# Patient Record
Sex: MALE | Race: WHITE | HISPANIC OR LATINO | Employment: FULL TIME | URBAN - METROPOLITAN AREA
[De-identification: names, ages, dates, MRNs, and addresses within clinical notes are randomized per-mention and may not be internally consistent; named-entity substitution may affect disease eponyms.]

---

## 2017-01-04 ENCOUNTER — TELEPHONE (OUTPATIENT)
Dept: VASCULAR LAB | Facility: MEDICAL CENTER | Age: 62
End: 2017-01-04

## 2017-01-09 ENCOUNTER — ANTICOAGULATION MONITORING (OUTPATIENT)
Dept: VASCULAR LAB | Facility: MEDICAL CENTER | Age: 62
End: 2017-01-09

## 2017-01-09 LAB — INR PPP: 2.7 (ref 2–3.5)

## 2017-01-10 NOTE — PROGRESS NOTES
Anticoagulation Summary as of 1/9/2017     INR goal 2.0-3.0    Selected INR 2.7 (1/9/2017)    Maintenance plan 3 mg (3 mg x 1) on Wed; 4.5 mg (3 mg x 1.5) all other days    Weekly total 30 mg    Plan last modified Maci Pathak PHARMD (5/11/2015)    Next INR check 1/23/2017    Target end date Indefinite      Anticoagulation Episode Summary     INR check location Home Draw    Preferred lab     Send INR reminders to     Comments       Anticoagulation Care Providers     Provider Role Specialty Phone number    El Ellis, KERRI           Anticoagulation Patient Findings    Left voicemail message to report a therapeutic INR of 2.7.  Pt to continue with current warfarin dosing regimen. Requested pt contact the clinic for any s/s of unusual bleeding, bruising, clotting or any changes to diet or medication. FU 2 weeks.  Also informed pt of Pt self monitoring program, please follow up at next call if he is interested.    El Ellis, YANCID

## 2017-02-01 ENCOUNTER — TELEPHONE (OUTPATIENT)
Dept: VASCULAR LAB | Facility: MEDICAL CENTER | Age: 62
End: 2017-02-01

## 2017-02-01 LAB — INR PPP: 2.4 (ref 2–3.5)

## 2017-02-02 ENCOUNTER — ANTICOAGULATION MONITORING (OUTPATIENT)
Dept: VASCULAR LAB | Facility: MEDICAL CENTER | Age: 62
End: 2017-02-02

## 2017-02-02 NOTE — PROGRESS NOTES
Anticoagulation Summary as of 2/2/2017     INR goal 2.0-3.0    Selected INR 2.4 (2/1/2017)    Maintenance plan 3 mg (3 mg x 1) on Wed; 4.5 mg (3 mg x 1.5) all other days    Weekly total 30 mg    Plan last modified Maci Pathak, PHARMD (5/11/2015)    Next INR check 3/1/2017    Target end date Indefinite      Anticoagulation Episode Summary     INR check location Home Draw    Preferred lab     Send INR reminders to     Comments       Anticoagulation Care Providers     Provider Role Specialty Phone number    El Ellis, YANCID           Anticoagulation Patient Findings    Left voicemail message to report a therapeutic INR of 2.4.  Pt to continue with current warfarin dosing regimen. Requested pt contact the clinic for any s/s of unusual bleeding, bruising, clotting or any changes to diet or medication. FU 4 weeks.  Miri Ragsdale, PHARMD

## 2017-02-11 ENCOUNTER — HOSPITAL ENCOUNTER (OUTPATIENT)
Dept: LAB | Facility: MEDICAL CENTER | Age: 62
End: 2017-02-11
Attending: FAMILY MEDICINE
Payer: COMMERCIAL

## 2017-02-11 LAB
ALBUMIN SERPL BCP-MCNC: 3.6 G/DL (ref 3.2–4.9)
ALBUMIN/GLOB SERPL: 1.2 G/DL
ALP SERPL-CCNC: 29 U/L (ref 30–99)
ALT SERPL-CCNC: 12 U/L (ref 2–50)
ANION GAP SERPL CALC-SCNC: 8 MMOL/L (ref 0–11.9)
AST SERPL-CCNC: 13 U/L (ref 12–45)
BILIRUB SERPL-MCNC: 0.5 MG/DL (ref 0.1–1.5)
BUN SERPL-MCNC: 19 MG/DL (ref 8–22)
CALCIUM SERPL-MCNC: 9.5 MG/DL (ref 8.5–10.5)
CHLORIDE SERPL-SCNC: 104 MMOL/L (ref 96–112)
CHOLEST SERPL-MCNC: 126 MG/DL (ref 100–199)
CO2 SERPL-SCNC: 26 MMOL/L (ref 20–33)
CREAT SERPL-MCNC: 1.37 MG/DL (ref 0.5–1.4)
EST. AVERAGE GLUCOSE BLD GHB EST-MCNC: 194 MG/DL
GLOBULIN SER CALC-MCNC: 3 G/DL (ref 1.9–3.5)
GLUCOSE SERPL-MCNC: 179 MG/DL (ref 65–99)
HBA1C MFR BLD: 8.4 % (ref 0–5.6)
HDLC SERPL-MCNC: 31 MG/DL
LDLC SERPL CALC-MCNC: 76 MG/DL
POTASSIUM SERPL-SCNC: 4.4 MMOL/L (ref 3.6–5.5)
PROT SERPL-MCNC: 6.6 G/DL (ref 6–8.2)
SODIUM SERPL-SCNC: 138 MMOL/L (ref 135–145)
TRIGL SERPL-MCNC: 95 MG/DL (ref 0–149)

## 2017-02-11 PROCEDURE — 80061 LIPID PANEL: CPT

## 2017-02-11 PROCEDURE — 80053 COMPREHEN METABOLIC PANEL: CPT

## 2017-02-11 PROCEDURE — 83036 HEMOGLOBIN GLYCOSYLATED A1C: CPT

## 2017-02-11 PROCEDURE — 36415 COLL VENOUS BLD VENIPUNCTURE: CPT

## 2017-02-18 ENCOUNTER — HOSPITAL ENCOUNTER (OUTPATIENT)
Dept: RADIOLOGY | Facility: MEDICAL CENTER | Age: 62
End: 2017-02-18
Attending: FAMILY MEDICINE
Payer: COMMERCIAL

## 2017-02-18 DIAGNOSIS — M54.42 CHRONIC MIDLINE LOW BACK PAIN WITH BILATERAL SCIATICA: ICD-10-CM

## 2017-02-18 DIAGNOSIS — M54.41 CHRONIC MIDLINE LOW BACK PAIN WITH BILATERAL SCIATICA: ICD-10-CM

## 2017-02-18 DIAGNOSIS — G89.29 CHRONIC MIDLINE LOW BACK PAIN WITH BILATERAL SCIATICA: ICD-10-CM

## 2017-02-18 PROCEDURE — 72100 X-RAY EXAM L-S SPINE 2/3 VWS: CPT

## 2017-04-11 ENCOUNTER — ANTICOAGULATION VISIT (OUTPATIENT)
Dept: VASCULAR LAB | Facility: MEDICAL CENTER | Age: 62
End: 2017-04-11
Attending: INTERNAL MEDICINE
Payer: COMMERCIAL

## 2017-04-11 DIAGNOSIS — Z79.01 CHRONIC ANTICOAGULATION: ICD-10-CM

## 2017-04-11 LAB — INR PPP: 2 (ref 2–3.5)

## 2017-04-11 PROCEDURE — 85610 PROTHROMBIN TIME: CPT

## 2017-04-11 PROCEDURE — 99211 OFF/OP EST MAY X REQ PHY/QHP: CPT

## 2017-04-11 NOTE — MR AVS SNAPSHOT
Tobias Morin   2017 7:30 AM   Anticoagulation Visit   MRN: [id number]    Department:  Vascular Medicine   Dept Phone:  [phone]    Description:  Male : 1955   Provider:  [facility] EXAM 5           Allergies as of 2017     Allergen Noted Reactions    Levofloxacin 10/23/2015   Rash, Itching    rash      Vital Signs     Smoking Status                   Former Smoker           Basic Information     Date Of Birth Sex Race Ethnicity Preferred Language    1955 Male White Non- English      Your appointments     2017  7:30 AM   Established Patient with [facility] EXAM 4   [facility]  (--)    [address] [facility] [address]   [phone]              Problem List              ICD-10-CM Priority Class Noted - Resolved    Diabetes mellitus type 2 in obese (CMS-HCC) E11.9, E66.9 Low  2011 - Present    Morbid obesity (CMS-HCC) E66.01 Low  2011 - Present    COREY on CPAP G47.33 Low  2011 - Present    Pulmonary embolism, bilateral (CMS-HCC) I26.99   2011 - Present    Lymphedema I89.0   2011 - Present    Benign prostatic hypertrophy N40.0   2011 - Present    Pulmonary embolism (CMS-HCC) I26.99   2012 - Present    Pulmonary HTN (CMS-HCC) I27.2   9/15/2012 - Present    ARF (acute renal failure) (CMS-HCC) N17.9 Medium  9/15/2012 - Present    COPD (chronic obstructive pulmonary disease) (CMS-HCC) J44.9 Medium  9/15/2012 - Present    PUD (peptic ulcer disease) K27.9   9/15/2012 - Present    Rotator cuff (capsule) sprain S43.429A   2013 - Present    Septic shock (CMS-HCC) A41.9, R65.21 High  10/11/2013 - Present    UTI (urinary tract infection) N39.0 High  10/12/2013 - Present    History of pulmonary embolism Z86.711 Medium  10/12/2013 - Present    Hypomagnesemia E83.42 High  10/12/2013 - Present    Chronic anticoagulation Z79.01 High  10/12/2013 - Present    Hypophosphatemia E83.39 Medium   10/12/2013 - Present    Sepsis (CMS-HCC) A41.9 High  10/23/2015 - Present    Lower urinary tract infectious disease N39.0   10/24/2015 - Present    Hypokalemia E87.6   10/24/2015 - Present    Hyponatremia E87.1   10/24/2015 - Present      Health Maintenance        Date Due Completion Dates    DIABETES MONOFILAMENT / LE EXAM 4/16/1955 ---    RETINAL SCREENING 10/16/1972 ---    IMM DTaP/Tdap/Td Vaccine (1 - Tdap) 10/16/1973 ---    IMM ZOSTER VACCINE 10/16/2014 ---    URINE ACR / MICROALBUMIN 11/15/2015 11/15/2014, 7/5/2014, 10/8/2013, 1/26/2013    A1C SCREENING 8/11/2017 2/11/2017, 11/5/2016, 11/10/2015, 12/22/2011, 11/22/2011    FASTING LIPID PROFILE 2/11/2018 2/11/2017, 11/5/2016, 7/9/2016, 4/23/2016, 1/9/2016, 10/17/2015, 5/9/2015, 11/15/2014, 7/5/2014, 3/29/2014, 12/14/2013, 10/10/2013, 9/28/2013, 6/15/2013, 3/30/2013, 12/1/2012, 6/9/2012, 3/10/2012, 11/22/2011    SERUM CREATININE 2/11/2018 2/11/2017, 11/5/2016, 7/9/2016, 4/23/2016, 1/9/2016, 11/11/2015, 11/10/2015, 11/9/2015, 10/26/2015, 10/24/2015, 10/23/2015, 10/23/2015, 10/17/2015, 6/20/2015, 5/9/2015, 11/15/2014, 11/15/2014, 7/5/2014, 3/29/2014, 12/14/2013, 10/13/2013, 10/12/2013, 10/10/2013, 10/8/2013, 9/28/2013, 8/23/2013, 6/15/2013, 3/30/2013, 1/26/2013, 12/1/2012, 9/22/2012, 9/19/2012, 9/18/2012, 9/17/2012, 9/16/2012, 9/15/2012, 9/14/2012, 9/13/2012, 9/12/2012, 6/9/2012, 3/16/2012, 3/10/2012, 1/11/2012, 1/10/2012, 1/9/2012, 1/8/2012, 1/7/2012, 1/5/2012, 1/5/2012, 1/4/2012, 1/3/2012, 1/3/2012, 12/29/2011, 12/28/2011, 12/27/2011, 12/26/2011, 12/25/2011, 12/24/2011, 12/23/2011, 12/22/2011, 12/21/2011, 11/22/2011    COLONOSCOPY 3/16/2022 3/16/2012            Results     POCT Protime      Component    INR    2.0                        Current Immunizations     Influenza TIV (IM) 10/12/2013 10:29 AM, 9/12/2012  6:11 PM    Influenza Vaccine Quad Inj (Preserved) 11/11/2015  2:00 PM    Pneumococcal polysaccharide vaccine (PPSV-23) 9/12/2012  6:15 PM      Below  and/or attached are the medications your provider expects you to take. Review all of your home medications and newly ordered medications with your provider and/or pharmacist. Follow medication instructions as directed by your provider and/or pharmacist. Please keep your medication list with you and share with your provider. Update the information when medications are discontinued, doses are changed, or new medications (including over-the-counter products) are added; and carry medication information at all times in the event of emergency situations     Allergies:  LEVOFLOXACIN - Rash,Itching               Medications  Valid as of: April 11, 2017 -  7:46 AM    Generic Name Brand Name Tablet Size Instructions for use    Atorvastatin Calcium (Tab) LIPITOR 10 MG Take 10 mg by mouth every evening.        Cefdinir (Cap) OMNICEF 300 MG Take 1 Cap by mouth 2 times a day.        Fenofibrate (Tab) TRIGLIDE 160 MG Take 160 mg by mouth every bedtime.        Lactobacillus (Pack) LACTINEX/FLORANEX  Take 1 Packet by mouth 3 times a day, with meals.        Lisinopril (Tab) PRINIVIL 10 MG Take 10 mg by mouth every bedtime. Indications: High Blood Pressure        Metoprolol Tartrate (Tab) LOPRESSOR 25 MG Take 25 mg by mouth every evening.        Pantoprazole Sodium (Tablet Delayed Response) PROTONIX 40 MG Take 40 mg by mouth 2 times a day.        Sertraline HCl (Tab) ZOLOFT 50 MG Take 50 mg by mouth every bedtime.        Tamsulosin HCl (Cap) FLOMAX 0.4 MG Take 0.4 mg by mouth every bedtime. Indications: Enlarged Prostate with Urination Problems        Warfarin Sodium (Tab) COUMADIN 3 MG Take one to one and one-half (1-1.5) tablets daily as directed by coumadin clinic        .                 Medicines prescribed today were sent to:     [id number] - [place], [place] - [address]    [address] [place] [address]    Phone: [phone] Fax: [phone]    Open 24 Hours?: No      Medication refill instructions:       If your  prescription bottle indicates you have medication refills left, it is not necessary to call your provider’s office. Please contact your pharmacy and they will refill your medication.    If your prescription bottle indicates you do not have any refills left, you may request refills at any time through one of the following ways: The online [employer] system (except Urgent Care), by calling your provider’s office, or by asking your pharmacy to contact your provider’s office with a refill request. Medication refills are processed only during regular business hours and may not be available until the next business day. Your provider may request additional information or to have a follow-up visit with you prior to refilling your medication.   *Please Note: Medication refills are assigned a new Rx number when refilled electronically. Your pharmacy may indicate that no refills were authorized even though a new prescription for the same medication is available at the pharmacy. Please request the medicine by name with the pharmacy before contacting your provider for a refill.        Warfarin Dosing Calendar   April 2017 Details    Sun Mon Tue Wed Thu Fri Sat           1                 2               3               4               5               6               7               8                 9               10               11   2.0   4.5 mg   See details      12      3 mg         13      4.5 mg         14      4.5 mg         15      4.5 mg           16      4.5 mg         17      4.5 mg         18      4.5 mg         19      3 mg         20      4.5 mg         21      4.5 mg         22      4.5 mg           23      4.5 mg         24      4.5 mg         25      4.5 mg         26      3 mg         27      4.5 mg         28      4.5 mg         29      4.5 mg           30      4.5 mg                Date Details   04/11 This INR check   INR: 2.0               How to take your warfarin dose     To take:  3 mg Take 1 of the 3 mg  tablets.    To take:  4.5 mg Take 1.5 of the 3 mg tablets.           Warfarin Dosing Calendar   May 2017 Details    Sun Mon Tue Wed Thu Fri Sat      1      4.5 mg         2      4.5 mg         3      3 mg         4      4.5 mg         5      4.5 mg         6      4.5 mg           7      4.5 mg         8      4.5 mg         9      4.5 mg         10      3 mg         11      4.5 mg         12      4.5 mg         13      4.5 mg           14      4.5 mg         15      4.5 mg         16      4.5 mg         17      3 mg         18      4.5 mg         19      4.5 mg         20      4.5 mg           21      4.5 mg         22      4.5 mg         23      4.5 mg         24      3 mg         25      4.5 mg         26      4.5 mg         27      4.5 mg           28      4.5 mg         29      4.5 mg         30      4.5 mg         31      3 mg             Date Details   No additional details            How to take your warfarin dose     To take:  3 mg Take 1 of the 3 mg tablets.    To take:  4.5 mg Take 1.5 of the 3 mg tablets.           Warfarin Dosing Calendar   June 2017 Details    Sun Mon Tue Wed Thu Fri Sat         1      4.5 mg         2      4.5 mg         3      4.5 mg           4      4.5 mg         5      4.5 mg         6      4.5 mg         7               8               9               10                 11               12               13               14               15               16               17                 18               19               20               21               22               23               24                 25               26               27               28               29               30                 Date Details   No additional details    Date of next INR:  6/6/2017         How to take your warfarin dose     To take:  4.5 mg Take 1.5 of the 3 mg tablets.              MyChart Status: Patient Declined

## 2017-04-11 NOTE — PROGRESS NOTES
Anticoagulation Summary as of 4/11/2017     INR goal 2.0-3.0    Selected INR 2.0 (4/11/2017)    Maintenance plan 3 mg (3 mg x 1) on Wed; 4.5 mg (3 mg x 1.5) all other days    Weekly total 30 mg    Plan last modified Maci Pathak, PHARMD (5/11/2015)    Next INR check 6/6/2017    Target end date Indefinite      Anticoagulation Episode Summary     INR check location Home Draw    Preferred lab     Send INR reminders to     Comments       Anticoagulation Care Providers     Provider Role Specialty Phone number    El Ellis, YANCID           Anticoagulation Patient Findings      Tobias Morin seen in clinic today  INR  therapeutic.    Denies signs/symptoms of bleeding and/or thrombosis.    Denies changes to diet or medications.   Follow up appointment in 8 week(s).    Continue weekly warfarin dose as noted    Barrington Freeman, PHARMD

## 2017-04-12 LAB — INR BLD: 2 (ref 0.9–1.2)

## 2017-05-13 ENCOUNTER — HOSPITAL ENCOUNTER (OUTPATIENT)
Dept: LAB | Facility: MEDICAL CENTER | Age: 62
End: 2017-05-13
Attending: FAMILY MEDICINE
Payer: COMMERCIAL

## 2017-05-13 LAB
ALBUMIN SERPL BCP-MCNC: 3.8 G/DL (ref 3.2–4.9)
ALBUMIN/GLOB SERPL: 1.3 G/DL
ALP SERPL-CCNC: 26 U/L (ref 30–99)
ALT SERPL-CCNC: 13 U/L (ref 2–50)
ANION GAP SERPL CALC-SCNC: 7 MMOL/L (ref 0–11.9)
AST SERPL-CCNC: 16 U/L (ref 12–45)
BILIRUB SERPL-MCNC: 0.6 MG/DL (ref 0.1–1.5)
BUN SERPL-MCNC: 19 MG/DL (ref 8–22)
CALCIUM SERPL-MCNC: 9.4 MG/DL (ref 8.5–10.5)
CHLORIDE SERPL-SCNC: 106 MMOL/L (ref 96–112)
CHOLEST SERPL-MCNC: 111 MG/DL (ref 100–199)
CO2 SERPL-SCNC: 24 MMOL/L (ref 20–33)
CREAT SERPL-MCNC: 1.34 MG/DL (ref 0.5–1.4)
EST. AVERAGE GLUCOSE BLD GHB EST-MCNC: 137 MG/DL
GFR SERPL CREATININE-BSD FRML MDRD: 54 ML/MIN/1.73 M 2
GLOBULIN SER CALC-MCNC: 2.9 G/DL (ref 1.9–3.5)
GLUCOSE SERPL-MCNC: 106 MG/DL (ref 65–99)
HBA1C MFR BLD: 6.4 % (ref 0–5.6)
HDLC SERPL-MCNC: 35 MG/DL
LDLC SERPL CALC-MCNC: 61 MG/DL
POTASSIUM SERPL-SCNC: 4 MMOL/L (ref 3.6–5.5)
PROT SERPL-MCNC: 6.7 G/DL (ref 6–8.2)
SODIUM SERPL-SCNC: 137 MMOL/L (ref 135–145)
TRIGL SERPL-MCNC: 73 MG/DL (ref 0–149)

## 2017-05-13 PROCEDURE — 36415 COLL VENOUS BLD VENIPUNCTURE: CPT

## 2017-05-13 PROCEDURE — 80053 COMPREHEN METABOLIC PANEL: CPT

## 2017-05-13 PROCEDURE — 80061 LIPID PANEL: CPT

## 2017-05-13 PROCEDURE — 83036 HEMOGLOBIN GLYCOSYLATED A1C: CPT

## 2017-05-17 ENCOUNTER — HOSPITAL ENCOUNTER (OUTPATIENT)
Facility: MEDICAL CENTER | Age: 62
End: 2017-05-17
Attending: FAMILY MEDICINE
Payer: COMMERCIAL

## 2017-05-17 PROCEDURE — 81001 URINALYSIS AUTO W/SCOPE: CPT

## 2017-05-17 PROCEDURE — 87086 URINE CULTURE/COLONY COUNT: CPT

## 2017-05-18 LAB
APPEARANCE UR: ABNORMAL
BACTERIA #/AREA URNS HPF: ABNORMAL /HPF
COLOR UR: YELLOW
CULTURE IF INDICATED INDCX: YES UA CULTURE
EPI CELLS #/AREA URNS HPF: ABNORMAL /HPF
GLUCOSE UR STRIP.AUTO-MCNC: NEGATIVE MG/DL
KETONES UR STRIP.AUTO-MCNC: NEGATIVE MG/DL
LEUKOCYTE ESTERASE UR QL STRIP.AUTO: ABNORMAL
MICRO URNS: ABNORMAL
MUCOUS THREADS #/AREA URNS HPF: ABNORMAL /HPF
NITRITE UR QL STRIP.AUTO: NEGATIVE
PH UR STRIP.AUTO: 8 [PH]
PROT UR QL STRIP: 30 MG/DL
RBC # URNS HPF: ABNORMAL /HPF
RBC UR QL AUTO: ABNORMAL
WBC #/AREA URNS HPF: ABNORMAL /HPF

## 2017-05-20 LAB
BACTERIA UR CULT: NORMAL
SIGNIFICANT IND 70042: NORMAL
SOURCE SOURCE: NORMAL

## 2017-06-06 ENCOUNTER — HOSPITAL ENCOUNTER (OUTPATIENT)
Facility: MEDICAL CENTER | Age: 62
End: 2017-06-06
Attending: NURSE PRACTITIONER
Payer: COMMERCIAL

## 2017-06-06 ENCOUNTER — ANTICOAGULATION VISIT (OUTPATIENT)
Dept: VASCULAR LAB | Facility: MEDICAL CENTER | Age: 62
End: 2017-06-06
Attending: INTERNAL MEDICINE
Payer: COMMERCIAL

## 2017-06-06 DIAGNOSIS — Z79.01 CHRONIC ANTICOAGULATION: ICD-10-CM

## 2017-06-06 LAB
INR BLD: 2.1 (ref 0.9–1.2)
INR PPP: 2.1 (ref 2–3.5)

## 2017-06-06 PROCEDURE — 87086 URINE CULTURE/COLONY COUNT: CPT

## 2017-06-06 PROCEDURE — 85610 PROTHROMBIN TIME: CPT

## 2017-06-06 PROCEDURE — 99211 OFF/OP EST MAY X REQ PHY/QHP: CPT

## 2017-06-06 NOTE — MR AVS SNAPSHOT
Tobias Morin   2017 7:30 AM   Anticoagulation Visit   MRN: [id number]    Department:  Vascular Medicine   Dept Phone:  [phone]    Description:  Male : 1955   Provider:  [facility] EXAM 4           Allergies as of 2017     Allergen Noted Reactions    Levofloxacin 10/23/2015   Rash, Itching    rash      Vital Signs     Smoking Status                   Former Smoker           Basic Information     Date Of Birth Sex Race Ethnicity Preferred Language    1955 Male White Non- English      Your appointments     Aug 01, 2017  7:30 AM   Established Patient with [facility] EXAM 4   [facility]  (--)    [address] [facility] [address]   [phone]              Problem List              ICD-10-CM Priority Class Noted - Resolved    Diabetes mellitus type 2 in obese (CMS-HCC) E11.9, E66.9 Low  2011 - Present    Morbid obesity (CMS-HCC) E66.01 Low  2011 - Present    COREY on CPAP G47.33, Z99.89 Low  2011 - Present    Pulmonary embolism, bilateral (CMS-HCC) I26.99   2011 - Present    Lymphedema I89.0   2011 - Present    Benign prostatic hypertrophy N40.0   2011 - Present    Pulmonary embolism (CMS-HCC) I26.99   2012 - Present    Pulmonary HTN (CMS-HCC) I27.2   9/15/2012 - Present    ARF (acute renal failure) (CMS-HCC) N17.9 Medium  9/15/2012 - Present    COPD (chronic obstructive pulmonary disease) (CMS-HCC) J44.9 Medium  9/15/2012 - Present    PUD (peptic ulcer disease) K27.9   9/15/2012 - Present    Rotator cuff (capsule) sprain S43.429A   2013 - Present    Septic shock (CMS-HCC) A41.9, R65.21 High  10/11/2013 - Present    UTI (urinary tract infection) N39.0 High  10/12/2013 - Present    History of pulmonary embolism Z86.711 Medium  10/12/2013 - Present    Hypomagnesemia E83.42 High  10/12/2013 - Present    Chronic anticoagulation Z79.01 High  10/12/2013 - Present    Hypophosphatemia E83.39  Medium  10/12/2013 - Present    Sepsis (CMS-HCC) A41.9 High  10/23/2015 - Present    Lower urinary tract infectious disease N39.0   10/24/2015 - Present    Hypokalemia E87.6   10/24/2015 - Present    Hyponatremia E87.1   10/24/2015 - Present      Health Maintenance        Date Due Completion Dates    DIABETES MONOFILAMENT / LE EXAM 4/16/1955 ---    RETINAL SCREENING 10/16/1972 ---    IMM DTaP/Tdap/Td Vaccine (1 - Tdap) 10/16/1973 ---    IMM ZOSTER VACCINE 10/16/2014 ---    URINE ACR / MICROALBUMIN 11/15/2015 11/15/2014, 7/5/2014, 10/8/2013, 1/26/2013    A1C SCREENING 11/13/2017 5/13/2017, 2/11/2017, 11/5/2016, 11/10/2015, 12/22/2011, 11/22/2011    FASTING LIPID PROFILE 5/13/2018 5/13/2017, 2/11/2017, 11/5/2016, 7/9/2016, 4/23/2016, 1/9/2016, 10/17/2015, 5/9/2015, 11/15/2014, 7/5/2014, 3/29/2014, 12/14/2013, 10/10/2013, 9/28/2013, 6/15/2013, 3/30/2013, 12/1/2012, 6/9/2012, 3/10/2012, 11/22/2011    SERUM CREATININE 5/13/2018 5/13/2017, 2/11/2017, 11/5/2016, 7/9/2016, 4/23/2016, 1/9/2016, 11/11/2015, 11/10/2015, 11/9/2015, 10/26/2015, 10/24/2015, 10/23/2015, 10/23/2015, 10/17/2015, 6/20/2015, 5/9/2015, 11/15/2014, 11/15/2014, 7/5/2014, 3/29/2014, 12/14/2013, 10/13/2013, 10/12/2013, 10/10/2013, 10/8/2013, 9/28/2013, 8/23/2013, 6/15/2013, 3/30/2013, 1/26/2013, 12/1/2012, 9/22/2012, 9/19/2012, 9/18/2012, 9/17/2012, 9/16/2012, 9/15/2012, 9/14/2012, 9/13/2012, 9/12/2012, 6/9/2012, 3/16/2012, 3/10/2012, 1/11/2012, 1/10/2012, 1/9/2012, 1/8/2012, 1/7/2012, 1/5/2012, 1/5/2012, 1/4/2012, 1/3/2012, 1/3/2012, 12/29/2011, 12/28/2011, 12/27/2011, 12/26/2011, 12/25/2011, 12/24/2011, 12/23/2011, 12/22/2011, 12/21/2011, 11/22/2011    COLONOSCOPY 3/16/2022 3/16/2012            Results     POCT Protime      Component    INR    2.1                        Current Immunizations     Influenza TIV (IM) 10/12/2013 10:29 AM, 9/12/2012  6:11 PM    Influenza Vaccine Quad Inj (Preserved) 11/11/2015  2:00 PM    Pneumococcal polysaccharide vaccine  (PPSV-23) 9/12/2012  6:15 PM      Below and/or attached are the medications your provider expects you to take. Review all of your home medications and newly ordered medications with your provider and/or pharmacist. Follow medication instructions as directed by your provider and/or pharmacist. Please keep your medication list with you and share with your provider. Update the information when medications are discontinued, doses are changed, or new medications (including over-the-counter products) are added; and carry medication information at all times in the event of emergency situations     Allergies:  LEVOFLOXACIN - Rash,Itching               Medications  Valid as of: June 06, 2017 -  7:32 AM    Generic Name Brand Name Tablet Size Instructions for use    Atorvastatin Calcium (Tab) LIPITOR 10 MG Take 10 mg by mouth every evening.        Cefdinir (Cap) OMNICEF 300 MG Take 1 Cap by mouth 2 times a day.        Fenofibrate (Tab) TRIGLIDE 160 MG Take 160 mg by mouth every bedtime.        Lactobacillus (Pack) LACTINEX/FLORANEX  Take 1 Packet by mouth 3 times a day, with meals.        Lisinopril (Tab) PRINIVIL 10 MG Take 10 mg by mouth every bedtime. Indications: High Blood Pressure        Metoprolol Tartrate (Tab) LOPRESSOR 25 MG Take 25 mg by mouth every evening.        Pantoprazole Sodium (Tablet Delayed Response) PROTONIX 40 MG Take 40 mg by mouth 2 times a day.        Sertraline HCl (Tab) ZOLOFT 50 MG Take 50 mg by mouth every bedtime.        Tamsulosin HCl (Cap) FLOMAX 0.4 MG Take 0.4 mg by mouth every bedtime. Indications: Enlarged Prostate with Urination Problems        Warfarin Sodium (Tab) COUMADIN 3 MG Take one to one and one-half (1-1.5) tablets daily as directed by coumadin clinic        .                 Medicines prescribed today were sent to:     [id number] - [place], [place] - [address]    [address] [place] [address]    Phone: [phone] Fax: [phone]    Open 24 Hours?: No      Medication refill  instructions:       If your prescription bottle indicates you have medication refills left, it is not necessary to call your provider’s office. Please contact your pharmacy and they will refill your medication.    If your prescription bottle indicates you do not have any refills left, you may request refills at any time through one of the following ways: The online [employer] system (except Urgent Care), by calling your provider’s office, or by asking your pharmacy to contact your provider’s office with a refill request. Medication refills are processed only during regular business hours and may not be available until the next business day. Your provider may request additional information or to have a follow-up visit with you prior to refilling your medication.   *Please Note: Medication refills are assigned a new Rx number when refilled electronically. Your pharmacy may indicate that no refills were authorized even though a new prescription for the same medication is available at the pharmacy. Please request the medicine by name with the pharmacy before contacting your provider for a refill.        Warfarin Dosing Calendar   June 2017 Details    Sun Mon Tue Wed Thu Fri Sat         1               2               3                 4               5               6   2.1   4.5 mg   See details      7      3 mg         8      4.5 mg         9      4.5 mg         10      4.5 mg           11      4.5 mg         12      4.5 mg         13      4.5 mg         14      3 mg         15      4.5 mg         16      4.5 mg         17      4.5 mg           18      4.5 mg         19      4.5 mg         20      4.5 mg         21      3 mg         22      4.5 mg         23      4.5 mg         24      4.5 mg           25      4.5 mg         26      4.5 mg         27      4.5 mg         28      3 mg         29      4.5 mg         30      4.5 mg           Date Details   06/06 This INR check   INR: 2.1      Date of next INR: No date  specified         How to take your warfarin dose     To take:  3 mg Take 1 of the 3 mg tablets.    To take:  4.5 mg Take 1.5 of the 3 mg tablets.              MyChart Status: Patient Declined

## 2017-06-06 NOTE — PROGRESS NOTES
Anticoagulation Summary as of 6/6/2017     INR goal 2.0-3.0    Selected INR 2.1 (6/6/2017)    Maintenance plan 3 mg (3 mg x 1) on Wed; 4.5 mg (3 mg x 1.5) all other days    Weekly total 30 mg    Plan last modified Maci Pathak, PHARMD (5/11/2015)    Next INR check 8/1/2017    Target end date Indefinite      Anticoagulation Episode Summary     INR check location Home Draw    Preferred lab     Send INR reminders to     Comments       Anticoagulation Care Providers     Provider Role Specialty Phone number    El Ellis, PHARMD           Anticoagulation Patient Findings      Current Outpatient Prescriptions on File Prior to Visit   Medication Sig Dispense Refill   • warfarin (COUMADIN) 3 MG Tab Take one to one and one-half (1-1.5) tablets daily as directed by coumadin clinic 135 Tab 1   • lactobacillus granules (LACTINEX/FLORANEX) Pack Take 1 Packet by mouth 3 times a day, with meals.     • cefdinir (OMNICEF) 300 MG Cap Take 1 Cap by mouth 2 times a day. 14 Cap 0   • atorvastatin (LIPITOR) 10 MG Tab Take 10 mg by mouth every evening.     • sertraline (ZOLOFT) 50 MG Tab Take 50 mg by mouth every bedtime.     • tamsulosin (FLOMAX) 0.4 MG capsule Take 0.4 mg by mouth every bedtime. Indications: Enlarged Prostate with Urination Problems     • fenofibrate (TRIGLIDE) 160 MG tablet Take 160 mg by mouth every bedtime.     • metoprolol (LOPRESSOR) 25 MG TABS Take 25 mg by mouth every evening.     • pantoprazole (PROTONIX) 40 MG TBEC Take 40 mg by mouth 2 times a day.     • lisinopril (PRINIVIL) 10 MG TABS Take 10 mg by mouth every bedtime. Indications: High Blood Pressure       No current facility-administered medications on file prior to visit.       Lab Results   Component Value Date/Time    SODIUM 137 05/13/2017 09:01 AM    POTASSIUM 4.0 05/13/2017 09:01 AM    CHLORIDE 106 05/13/2017 09:01 AM    CO2 24 05/13/2017 09:01 AM    GLUCOSE 106* 05/13/2017 09:01 AM    BUN 19 05/13/2017 09:01 AM    CREATININE 1.34 05/13/2017  09:01 AM        Tobias Morin seen in clinic today  INR  therapeutic.    Denies signs/symptoms of bleeding and/or thrombosis.    Denies changes to diet or medications.   Follow up appointment in 8 week(s).    Continue weekly warfarin dose as noted     Barrington Freeman, PHARMD

## 2017-06-09 LAB
BACTERIA UR CULT: NORMAL
SIGNIFICANT IND 70042: NORMAL
SOURCE SOURCE: NORMAL

## 2017-06-12 ENCOUNTER — ANTICOAGULATION VISIT (OUTPATIENT)
Dept: VASCULAR LAB | Facility: MEDICAL CENTER | Age: 62
End: 2017-06-12
Attending: INTERNAL MEDICINE
Payer: COMMERCIAL

## 2017-06-12 DIAGNOSIS — Z79.01 LONG TERM (CURRENT) USE OF ANTICOAGULANTS: ICD-10-CM

## 2017-06-12 LAB
INR BLD: 2.6 (ref 0.9–1.2)
INR PPP: 2.6 (ref 2–3.5)

## 2017-06-12 PROCEDURE — 99211 OFF/OP EST MAY X REQ PHY/QHP: CPT

## 2017-06-12 PROCEDURE — 85610 PROTHROMBIN TIME: CPT

## 2017-06-12 NOTE — PROGRESS NOTES
OP Anticoagulation Service Note    Date: 6/12/2017    Anticoagulation Summary as of 6/12/2017     INR goal 2.0-3.0    Selected INR 2.6 (6/12/2017)    Maintenance plan 3 mg (3 mg x 1) on Wed; 4.5 mg (3 mg x 1.5) all other days    Weekly total 30 mg    Plan last modified Maci Pathak PHARMD (5/11/2015)    Next INR check 8/1/2017    Target end date Indefinite      Anticoagulation Episode Summary     INR check location Home Draw    Preferred lab     Send INR reminders to     Comments       Anticoagulation Care Providers     Provider Role Specialty Phone number    El Ellis PHARMD           Anticoagulation Patient Findings   Positives Medication Changes    Negatives Missed Doses, Extra Doses, Antibiotic Use, Diet Changes, Dental/Other Procedures, Hospitalization, Bleeding Gums, Nose Bleeds, Blood in Urine, Blood in Stool, Any Bruising, Other Complaints          Plan:  Patient is therapeutic today. Confirmed dosing regimen. Patient reports he was started on Bactrim about 1 week ago, he has 2 more days. It has not impacted his INR. Patient denies any signs or symptoms of bleeding or clotting. Instructed patient to call clinic if any unusual bleeding or bruising occurs. Will continue dosing as outlined. Will follow-up with patient in 6 week(s).        Maci Pathak PHARMD

## 2017-06-12 NOTE — MR AVS SNAPSHOT
Tobias Morin   2017 7:30 AM   Anticoagulation Visit   MRN: [id number]    Department:  Vascular Medicine   Dept Phone:  [phone]    Description:  Male : 1955   Provider:  [facility] EXAM 4           Allergies as of 2017     Allergen Noted Reactions    Levofloxacin 10/23/2015   Rash, Itching    rash      Vital Signs     Smoking Status                   Former Smoker           Basic Information     Date Of Birth Sex Race Ethnicity Preferred Language    1955 Male White Non- English      Your appointments     Aug 01, 2017  7:30 AM   Established Patient with [facility] EXAM 4   [facility]  (--)    [address] [facility] [place] [address]   [phone]              Problem List              ICD-10-CM Priority Class Noted - Resolved    Diabetes mellitus type 2 in obese (CMS-HCC) E11.9, E66.9 Low  2011 - Present    Morbid obesity (CMS-HCC) E66.01 Low  2011 - Present    COREY on CPAP G47.33, Z99.89 Low  2011 - Present    Pulmonary embolism, bilateral (CMS-HCC) I26.99   2011 - Present    Lymphedema I89.0   2011 - Present    Benign prostatic hypertrophy N40.0   2011 - Present    Pulmonary embolism (CMS-HCC) I26.99   2012 - Present    Pulmonary HTN (CMS-HCC) I27.2   9/15/2012 - Present    ARF (acute renal failure) (CMS-HCC) N17.9 Medium  9/15/2012 - Present    COPD (chronic obstructive pulmonary disease) (CMS-HCC) J44.9 Medium  9/15/2012 - Present    PUD (peptic ulcer disease) K27.9   9/15/2012 - Present    Rotator cuff (capsule) sprain S43.429A   2013 - Present    Septic shock (CMS-HCC) A41.9, R65.21 High  10/11/2013 - Present    UTI (urinary tract infection) N39.0 High  10/12/2013 - Present    History of pulmonary embolism Z86.711 Medium  10/12/2013 - Present    Hypomagnesemia E83.42 High  10/12/2013 - Present    Chronic anticoagulation Z79.01 High  10/12/2013 - Present    Hypophosphatemia E83.39  Medium  10/12/2013 - Present    Sepsis (CMS-HCC) A41.9 High  10/23/2015 - Present    Lower urinary tract infectious disease N39.0   10/24/2015 - Present    Hypokalemia E87.6   10/24/2015 - Present    Hyponatremia E87.1   10/24/2015 - Present      Health Maintenance        Date Due Completion Dates    DIABETES MONOFILAMENT / LE EXAM 4/16/1955 ---    RETINAL SCREENING 10/16/1972 ---    IMM DTaP/Tdap/Td Vaccine (1 - Tdap) 10/16/1973 ---    IMM ZOSTER VACCINE 10/16/2014 ---    URINE ACR / MICROALBUMIN 11/15/2015 11/15/2014, 7/5/2014, 10/8/2013, 1/26/2013    A1C SCREENING 11/13/2017 5/13/2017, 2/11/2017, 11/5/2016, 11/10/2015, 12/22/2011, 11/22/2011    FASTING LIPID PROFILE 5/13/2018 5/13/2017, 2/11/2017, 11/5/2016, 7/9/2016, 4/23/2016, 1/9/2016, 10/17/2015, 5/9/2015, 11/15/2014, 7/5/2014, 3/29/2014, 12/14/2013, 10/10/2013, 9/28/2013, 6/15/2013, 3/30/2013, 12/1/2012, 6/9/2012, 3/10/2012, 11/22/2011    SERUM CREATININE 5/13/2018 5/13/2017, 2/11/2017, 11/5/2016, 7/9/2016, 4/23/2016, 1/9/2016, 11/11/2015, 11/10/2015, 11/9/2015, 10/26/2015, 10/24/2015, 10/23/2015, 10/23/2015, 10/17/2015, 6/20/2015, 5/9/2015, 11/15/2014, 11/15/2014, 7/5/2014, 3/29/2014, 12/14/2013, 10/13/2013, 10/12/2013, 10/10/2013, 10/8/2013, 9/28/2013, 8/23/2013, 6/15/2013, 3/30/2013, 1/26/2013, 12/1/2012, 9/22/2012, 9/19/2012, 9/18/2012, 9/17/2012, 9/16/2012, 9/15/2012, 9/14/2012, 9/13/2012, 9/12/2012, 6/9/2012, 3/16/2012, 3/10/2012, 1/11/2012, 1/10/2012, 1/9/2012, 1/8/2012, 1/7/2012, 1/5/2012, 1/5/2012, 1/4/2012, 1/3/2012, 1/3/2012, 12/29/2011, 12/28/2011, 12/27/2011, 12/26/2011, 12/25/2011, 12/24/2011, 12/23/2011, 12/22/2011, 12/21/2011, 11/22/2011    COLONOSCOPY 3/16/2022 3/16/2012            Results     POCT Protime      Component    INR    2.6                        Current Immunizations     Influenza TIV (IM) 10/12/2013 10:29 AM, 9/12/2012  6:11 PM    Influenza Vaccine Quad Inj (Preserved) 11/11/2015  2:00 PM    Pneumococcal polysaccharide vaccine  (PPSV-23) 9/12/2012  6:15 PM      Below and/or attached are the medications your provider expects you to take. Review all of your home medications and newly ordered medications with your provider and/or pharmacist. Follow medication instructions as directed by your provider and/or pharmacist. Please keep your medication list with you and share with your provider. Update the information when medications are discontinued, doses are changed, or new medications (including over-the-counter products) are added; and carry medication information at all times in the event of emergency situations     Allergies:  LEVOFLOXACIN - Rash,Itching               Medications  Valid as of: June 12, 2017 -  7:42 AM    Generic Name Brand Name Tablet Size Instructions for use    Atorvastatin Calcium (Tab) LIPITOR 10 MG Take 10 mg by mouth every evening.        Cefdinir (Cap) OMNICEF 300 MG Take 1 Cap by mouth 2 times a day.        Fenofibrate (Tab) TRIGLIDE 160 MG Take 160 mg by mouth every bedtime.        Lactobacillus (Pack) LACTINEX/FLORANEX  Take 1 Packet by mouth 3 times a day, with meals.        Lisinopril (Tab) PRINIVIL 10 MG Take 10 mg by mouth every bedtime. Indications: High Blood Pressure        Metoprolol Tartrate (Tab) LOPRESSOR 25 MG Take 25 mg by mouth every evening.        Pantoprazole Sodium (Tablet Delayed Response) PROTONIX 40 MG Take 40 mg by mouth 2 times a day.        Sertraline HCl (Tab) ZOLOFT 50 MG Take 50 mg by mouth every bedtime.        Tamsulosin HCl (Cap) FLOMAX 0.4 MG Take 0.4 mg by mouth every bedtime. Indications: Enlarged Prostate with Urination Problems        Warfarin Sodium (Tab) COUMADIN 3 MG Take one to one and one-half (1-1.5) tablets daily as directed by coumadin clinic        .                 Medicines prescribed today were sent to:     [id number] - [place], [place] - [address]    [address] [place] [address]    Phone: [phone] Fax: [phone]    Open 24 Hours?: No      Medication refill  instructions:       If your prescription bottle indicates you have medication refills left, it is not necessary to call your provider’s office. Please contact your pharmacy and they will refill your medication.    If your prescription bottle indicates you do not have any refills left, you may request refills at any time through one of the following ways: The online Missing[employer] system (except Urgent Care), by calling your provider’s office, or by asking your pharmacy to contact your provider’s office with a refill request. Medication refills are processed only during regular business hours and may not be available until the next business day. Your provider may request additional information or to have a follow-up visit with you prior to refilling your medication.   *Please Note: Medication refills are assigned a new Rx number when refilled electronically. Your pharmacy may indicate that no refills were authorized even though a new prescription for the same medication is available at the pharmacy. Please request the medicine by name with the pharmacy before contacting your provider for a refill.        Warfarin Dosing Calendar   June 2017 Details    Sun Mon Tue Wed Thu Fri Sat         1               2               3                 4               5               6               7               8               9               10                 11               12   2.6   4.5 mg   See details      13      4.5 mg         14      3 mg         15      4.5 mg         16      4.5 mg         17      4.5 mg           18      4.5 mg         19      4.5 mg         20      4.5 mg         21      3 mg         22      4.5 mg         23      4.5 mg         24      4.5 mg           25      4.5 mg         26      4.5 mg         27      4.5 mg         28      3 mg         29      4.5 mg         30      4.5 mg           Date Details   06/12 This INR check   INR: 2.6               How to take your warfarin dose     To take:  3 mg Take 1  of the 3 mg tablets.    To take:  4.5 mg Take 1.5 of the 3 mg tablets.           Warfarin Dosing Calendar   July 2017 Details    Sun Mon Tue Wed Thu Fri Sat           1      4.5 mg           2      4.5 mg         3      4.5 mg         4      4.5 mg         5      3 mg         6      4.5 mg         7      4.5 mg         8      4.5 mg           9      4.5 mg         10      4.5 mg         11      4.5 mg         12      3 mg         13      4.5 mg         14      4.5 mg         15      4.5 mg           16      4.5 mg         17      4.5 mg         18      4.5 mg         19      3 mg         20      4.5 mg         21      4.5 mg         22      4.5 mg           23      4.5 mg         24      4.5 mg         25      4.5 mg         26      3 mg         27      4.5 mg         28      4.5 mg         29      4.5 mg           30      4.5 mg         31      4.5 mg               Date Details   No additional details            How to take your warfarin dose     To take:  3 mg Take 1 of the 3 mg tablets.    To take:  4.5 mg Take 1.5 of the 3 mg tablets.           Warfarin Dosing Calendar   August 2017 Details    Sun Mon Tue Wed Thu Fri Sat       1      4.5 mg         2               3               4               5                 6               7               8               9               10               11               12                 13               14               15               16               17               18               19                 20               21               22               23               24               25               26                 27               28               29               30               31                  Date Details   No additional details    Date of next INR:  8/1/2017         How to take your warfarin dose     To take:  4.5 mg Take 1.5 of the 3 mg tablets.              MyChart Status: Patient Declined

## 2017-06-27 ENCOUNTER — HOSPITAL ENCOUNTER (OUTPATIENT)
Dept: LAB | Facility: MEDICAL CENTER | Age: 62
End: 2017-06-27
Attending: FAMILY MEDICINE
Payer: COMMERCIAL

## 2017-06-27 ENCOUNTER — HOSPITAL ENCOUNTER (OUTPATIENT)
Facility: MEDICAL CENTER | Age: 62
End: 2017-06-27
Attending: FAMILY MEDICINE
Payer: COMMERCIAL

## 2017-06-27 LAB
BASOPHILS # BLD AUTO: 0.9 % (ref 0–1.8)
BASOPHILS # BLD: 0.05 K/UL (ref 0–0.12)
EOSINOPHIL # BLD AUTO: 0.21 K/UL (ref 0–0.51)
EOSINOPHIL NFR BLD: 3.6 % (ref 0–6.9)
ERYTHROCYTE [DISTWIDTH] IN BLOOD BY AUTOMATED COUNT: 44.2 FL (ref 35.9–50)
HCT VFR BLD AUTO: 43.7 % (ref 42–52)
HGB BLD-MCNC: 14.1 G/DL (ref 14–18)
IMM GRANULOCYTES # BLD AUTO: 0.03 K/UL (ref 0–0.11)
IMM GRANULOCYTES NFR BLD AUTO: 0.5 % (ref 0–0.9)
LYMPHOCYTES # BLD AUTO: 1.04 K/UL (ref 1–4.8)
LYMPHOCYTES NFR BLD: 17.7 % (ref 22–41)
MCH RBC QN AUTO: 28.7 PG (ref 27–33)
MCHC RBC AUTO-ENTMCNC: 32.3 G/DL (ref 33.7–35.3)
MCV RBC AUTO: 88.8 FL (ref 81.4–97.8)
MONOCYTES # BLD AUTO: 0.53 K/UL (ref 0–0.85)
MONOCYTES NFR BLD AUTO: 9 % (ref 0–13.4)
NEUTROPHILS # BLD AUTO: 4.02 K/UL (ref 1.82–7.42)
NEUTROPHILS NFR BLD: 68.3 % (ref 44–72)
NRBC # BLD AUTO: 0 K/UL
NRBC BLD AUTO-RTO: 0 /100 WBC
PLATELET # BLD AUTO: 249 K/UL (ref 164–446)
PMV BLD AUTO: 10.3 FL (ref 9–12.9)
PSA SERPL-MCNC: 3 NG/ML (ref 0–4)
RBC # BLD AUTO: 4.92 M/UL (ref 4.7–6.1)
WBC # BLD AUTO: 5.9 K/UL (ref 4.8–10.8)

## 2017-06-27 PROCEDURE — 85025 COMPLETE CBC W/AUTO DIFF WBC: CPT

## 2017-06-27 PROCEDURE — 84153 ASSAY OF PSA TOTAL: CPT

## 2017-06-27 PROCEDURE — 87086 URINE CULTURE/COLONY COUNT: CPT

## 2017-06-27 PROCEDURE — 36415 COLL VENOUS BLD VENIPUNCTURE: CPT

## 2017-06-29 DIAGNOSIS — Z86.711 HISTORY OF PULMONARY EMBOLISM: ICD-10-CM

## 2017-06-29 RX ORDER — WARFARIN SODIUM 3 MG/1
TABLET ORAL
Refills: 0 | OUTPATIENT
Start: 2017-06-29

## 2017-06-29 RX ORDER — WARFARIN SODIUM 3 MG/1
TABLET ORAL
Qty: 135 TAB | Refills: 1 | Status: SHIPPED | OUTPATIENT
Start: 2017-06-29 | End: 2018-01-28 | Stop reason: SDUPTHER

## 2017-06-30 LAB
BACTERIA UR CULT: NORMAL
SIGNIFICANT IND 70042: NORMAL
SOURCE SOURCE: NORMAL

## 2017-07-11 ENCOUNTER — ANTICOAGULATION MONITORING (OUTPATIENT)
Dept: VASCULAR LAB | Facility: MEDICAL CENTER | Age: 62
End: 2017-07-11

## 2017-07-11 DIAGNOSIS — Z79.01 LONG TERM (CURRENT) USE OF ANTICOAGULANTS: ICD-10-CM

## 2017-07-17 ENCOUNTER — ANTICOAGULATION VISIT (OUTPATIENT)
Dept: VASCULAR LAB | Facility: MEDICAL CENTER | Age: 62
End: 2017-07-17
Attending: INTERNAL MEDICINE
Payer: COMMERCIAL

## 2017-07-17 ENCOUNTER — TELEPHONE (OUTPATIENT)
Dept: VASCULAR LAB | Facility: MEDICAL CENTER | Age: 62
End: 2017-07-17

## 2017-07-17 DIAGNOSIS — Z79.01 LONG TERM (CURRENT) USE OF ANTICOAGULANTS: ICD-10-CM

## 2017-07-17 DIAGNOSIS — I26.99 OTHER PULMONARY EMBOLISM WITHOUT ACUTE COR PULMONALE, UNSPECIFIED CHRONICITY (HCC): ICD-10-CM

## 2017-07-17 PROCEDURE — 99212 OFFICE O/P EST SF 10 MIN: CPT

## 2017-07-17 NOTE — TELEPHONE ENCOUNTER
[facility]    Requested referral from pt's listed PCP Dr Ema Lucero.    El Ellis, PHARMD

## 2017-07-17 NOTE — MR AVS SNAPSHOT
Tobias C Mikel   2017 7:30 AM   Anticoagulation Visit   MRN: [id number]    Department:  Vascular Medicine   Dept Phone:  [phone]    Description:  Male : 1955   Provider:  [facility] EXAM 4           Allergies as of 2017     Allergen Noted Reactions    Levofloxacin 10/23/2015   Rash, Itching    rash      You were diagnosed with     Long term (current) use of anticoagulants   [V58.61.ICD-9-CM]         Vital Signs     Smoking Status                   Former Smoker           Basic Information     Date Of Birth Sex Race Ethnicity Preferred Language    1955 Male White Non- English      Your appointments     2017  7:30 AM   Established Patient with [facility] EXAM 5   [facility]  (--)    [address] [facility] [place] [address] [phone]            Aug 01, 2017  7:30 AM   Established Patient with [facility] EXAM 4   [facility]  (--)    [address] [facility] [address] [phone]              Problem List              ICD-10-CM Priority Class Noted - Resolved    Diabetes mellitus type 2 in obese (CMS-HCC) E11.9, E66.9 Low  2011 - Present    Morbid obesity (CMS-HCC) E66.01 Low  2011 - Present    COREY on CPAP G47.33, Z99.89 Low  2011 - Present    Pulmonary embolism, bilateral (CMS-HCC) I26.99   2011 - Present    Lymphedema I89.0   2011 - Present    Benign prostatic hypertrophy N40.0   2011 - Present    Pulmonary embolism (CMS-HCC) I26.99   2012 - Present    Pulmonary HTN (CMS-HCC) I27.2   9/15/2012 - Present    ARF (acute renal failure) (CMS-HCC) N17.9 Medium  9/15/2012 - Present    COPD (chronic obstructive pulmonary disease) (CMS-HCC) J44.9 Medium  9/15/2012 - Present    PUD (peptic ulcer disease) K27.9   9/15/2012 - Present    Rotator cuff (capsule) sprain S43.429A   2013 - Present    Septic shock (CMS-HCC) A41.9, R65.21 High   10/11/2013 - Present    UTI (urinary tract infection) N39.0 High  10/12/2013 - Present    History of pulmonary embolism Z86.711 Medium  10/12/2013 - Present    Hypomagnesemia E83.42 High  10/12/2013 - Present    Chronic anticoagulation Z79.01 High  10/12/2013 - Present    Hypophosphatemia E83.39 Medium  10/12/2013 - Present    Sepsis (CMS-HCC) A41.9 High  10/23/2015 - Present    Lower urinary tract infectious disease N39.0   10/24/2015 - Present    Hypokalemia E87.6   10/24/2015 - Present    Hyponatremia E87.1   10/24/2015 - Present    Long term (current) use of anticoagulants [Z79.01] Z79.01   6/12/2017 - Present      Health Maintenance        Date Due Completion Dates    DIABETES MONOFILAMENT / LE EXAM 4/16/1955 ---    RETINAL SCREENING 10/16/1972 ---    IMM DTaP/Tdap/Td Vaccine (1 - Tdap) 10/16/1973 ---    IMM ZOSTER VACCINE 10/16/2014 ---    URINE ACR / MICROALBUMIN 11/15/2015 11/15/2014, 7/5/2014, 10/8/2013, 1/26/2013    IMM INFLUENZA (1) 9/1/2017 11/11/2015, 10/12/2013, 9/12/2012    A1C SCREENING 11/13/2017 5/13/2017, 2/11/2017, 11/5/2016, 11/10/2015, 12/22/2011, 11/22/2011    FASTING LIPID PROFILE 5/13/2018 5/13/2017, 2/11/2017, 11/5/2016, 7/9/2016, 4/23/2016, 1/9/2016, 10/17/2015, 5/9/2015, 11/15/2014, 7/5/2014, 3/29/2014, 12/14/2013, 10/10/2013, 9/28/2013, 6/15/2013, 3/30/2013, 12/1/2012, 6/9/2012, 3/10/2012, 11/22/2011    SERUM CREATININE 5/13/2018 5/13/2017, 2/11/2017, 11/5/2016, 7/9/2016, 4/23/2016, 1/9/2016, 11/11/2015, 11/10/2015, 11/9/2015, 10/26/2015, 10/24/2015, 10/23/2015, 10/23/2015, 10/17/2015, 6/20/2015, 5/9/2015, 11/15/2014, 11/15/2014, 7/5/2014, 3/29/2014, 12/14/2013, 10/13/2013, 10/12/2013, 10/10/2013, 10/8/2013, 9/28/2013, 8/23/2013, 6/15/2013, 3/30/2013, 1/26/2013, 12/1/2012, 9/22/2012, 9/19/2012, 9/18/2012, 9/17/2012, 9/16/2012, 9/15/2012, 9/14/2012, 9/13/2012, 9/12/2012, 6/9/2012, 3/16/2012, 3/10/2012, 1/11/2012, 1/10/2012, 1/9/2012, 1/8/2012, 1/7/2012, 1/5/2012, 1/5/2012, 1/4/2012,  1/3/2012, 1/3/2012, 12/29/2011, 12/28/2011, 12/27/2011, 12/26/2011, 12/25/2011, 12/24/2011, 12/23/2011, 12/22/2011, 12/21/2011, 11/22/2011    COLONOSCOPY 3/16/2022 3/16/2012            Current Immunizations     Influenza TIV (IM) 10/12/2013 10:29 AM, 9/12/2012  6:11 PM    Influenza Vaccine Quad Inj (Preserved) 11/11/2015  2:00 PM    Pneumococcal polysaccharide vaccine (PPSV-23) 9/12/2012  6:15 PM      Below and/or attached are the medications your provider expects you to take. Review all of your home medications and newly ordered medications with your provider and/or pharmacist. Follow medication instructions as directed by your provider and/or pharmacist. Please keep your medication list with you and share with your provider. Update the information when medications are discontinued, doses are changed, or new medications (including over-the-counter products) are added; and carry medication information at all times in the event of emergency situations     Allergies:  LEVOFLOXACIN - Rash,Itching               Medications  Valid as of: July 17, 2017 -  7:49 AM    Generic Name Brand Name Tablet Size Instructions for use    Atorvastatin Calcium (Tab) LIPITOR 10 MG Take 10 mg by mouth every evening.        Cefdinir (Cap) OMNICEF 300 MG Take 1 Cap by mouth 2 times a day.        Enoxaparin Sodium (Solution) LOVENOX 150 MG/ML Inject 150 mg as instructed every 12 hours.        Fenofibrate (Tab) TRIGLIDE 160 MG Take 160 mg by mouth every bedtime.        Lactobacillus (Pack) LACTINEX/FLORANEX  Take 1 Packet by mouth 3 times a day, with meals.        Lisinopril (Tab) PRINIVIL 10 MG Take 10 mg by mouth every bedtime. Indications: High Blood Pressure        Metoprolol Tartrate (Tab) LOPRESSOR 25 MG Take 25 mg by mouth every evening.        Pantoprazole Sodium (Tablet Delayed Response) PROTONIX 40 MG Take 40 mg by mouth 2 times a day.        Sertraline HCl (Tab) ZOLOFT 50 MG Take 50 mg by mouth every bedtime.        Tamsulosin HCl  (Cap) FLOMAX 0.4 MG Take 0.4 mg by mouth every bedtime. Indications: Enlarged Prostate with Urination Problems        Warfarin Sodium (Tab) COUMADIN 3 MG Take one to one and one-half (1-1.5) tablets daily as directed by [facility]        .                 Medicines prescribed today were sent to:     [id number] - [place], [place] - [address] [place] [address]    Phone: [phone] Fax: [phone]    Open 24 Hours?: No      Medication refill instructions:       If your prescription bottle indicates you have medication refills left, it is not necessary to call your provider’s office. Please contact your pharmacy and they will refill your medication.    If your prescription bottle indicates you do not have any refills left, you may request refills at any time through one of the following ways: The online [employer] system (except Urgent Care), by calling your provider’s office, or by asking your pharmacy to contact your provider’s office with a refill request. Medication refills are processed only during regular business hours and may not be available until the next business day. Your provider may request additional information or to have a follow-up visit with you prior to refilling your medication.   *Please Note: Medication refills are assigned a new Rx number when refilled electronically. Your pharmacy may indicate that no refills were authorized even though a new prescription for the same medication is available at the pharmacy. Please request the medicine by name with the pharmacy before contacting your provider for a refill.        Warfarin Dosing Calendar   July 2017 Details    Sun Mon Tue Wed Thu Fri Sat           1                 2               3               4               5               6               7               8                 9               10               11               12               13               14               15                 16                17      4.5 mg   See details      18      4.5 mg         19      3 mg         20      4.5 mg         21      4.5 mg         22      Hold           23      Hold   See details      24      Hold   See details      25      Hold   See details      26      Hold   See details      27      4.5 mg         28      4.5 mg   See details      29      4.5 mg           30      4.5 mg         31      4.5 mg               Date Details   07/17 This INR check      07/23 Hold dose   Start Lovenox 150 mg subcutaneously twice daily      07/24 Hold dose   Lovenox 150 mg subcutaneously twice daily      07/25 Hold dose   Lovenox 150 mg subcutaneously twice daily      07/26 Hold dose   Lovenox 150 mg subcutaneously in AM ONLY then stop until after procedure      07/28 Resume Lovenox 150 mg twice daily until INR is > 2.0       Date of next INR:  7/31/2017         How to take your warfarin dose     To take:  3 mg Take 1 of the 3 mg tablets.    To take:  4.5 mg Take 1.5 of the 3 mg tablets.    Hold Do not take your warfarin dose. See the Details table to the right for additional instructions.                   MyChart Status: Patient Declined

## 2017-07-17 NOTE — PROGRESS NOTES
OP Anticoagulation Service Note    Date: 7/17/2017    Anticoagulation Summary as of 7/17/2017     INR goal 2.0-3.0   Selected INR No new INR was available at the time of this encounter.   Maintenance plan 3 mg (3 mg x 1) on Wed; 4.5 mg (3 mg x 1.5) all other days   Weekly total 30 mg   Plan last modified Maci Pathak, KERRI (5/11/2015)   Next INR check    Target end date Indefinite    Indications   Pulmonary embolism (CMS-HCC) [I26.99]  Long term (current) use of anticoagulants [Z79.01] [Z79.01]         Anticoagulation Episode Summary     INR check location Home Draw    Preferred lab     Send INR reminders to     Comments       Anticoagulation Care Providers     Provider Role Specialty Phone number    El Ellis, YANCID             Plan:  Patient here today for directions for bridging. He declines INR check and would only like instructions. He is having all his upper teeth pulled 7-27-17, they would like him off warfarin x 5 days for this. His has a hx of PE x 2 events. Would recommend bridging with lovenox. Discussed with pt he needs to get OK to stop warfarin from his PCP. Provided pt instructions for stopping warfarin. Most recent wt 181 kg , CrCl = 57, SCr = 1.34. Will dose lovenox 1 mg/kg, which is lovenox 180 mg twice daily which would require pt to do 2 injections in AM and 2 injections in PM. To make easier and less expensive for pt will have him do Lovenox 150 mg Twice daily which he is willing to do. Provided pt instructions for stopping warfarin and Lovenox as outlined in calendar. Follow up 4 days after procedure. He will call our clinic if Lovenox is unaffordable. Will also contact dentist to confirm they want pt off warfarin x 5 days before for tooth extraction.     Maci Pathak, PHARMD

## 2017-07-31 ENCOUNTER — ANTICOAGULATION VISIT (OUTPATIENT)
Dept: VASCULAR LAB | Facility: MEDICAL CENTER | Age: 62
End: 2017-07-31
Attending: INTERNAL MEDICINE
Payer: COMMERCIAL

## 2017-07-31 DIAGNOSIS — Z79.01 LONG TERM (CURRENT) USE OF ANTICOAGULANTS: ICD-10-CM

## 2017-07-31 DIAGNOSIS — I26.99 OTHER PULMONARY EMBOLISM WITHOUT ACUTE COR PULMONALE, UNSPECIFIED CHRONICITY (HCC): ICD-10-CM

## 2017-07-31 LAB — INR PPP: 1.2 (ref 2–3.5)

## 2017-07-31 PROCEDURE — 99212 OFFICE O/P EST SF 10 MIN: CPT | Performed by: NURSE PRACTITIONER

## 2017-07-31 PROCEDURE — 85610 PROTHROMBIN TIME: CPT

## 2017-07-31 NOTE — PROGRESS NOTES
Anticoagulation Summary as of 7/31/2017     INR goal 2.0-3.0   Selected INR 1.2! (7/31/2017)   Maintenance plan 3 mg (3 mg x 1) on Wed; 4.5 mg (3 mg x 1.5) all other days   Weekly total 30 mg   Plan last modified Maci Pathak, PHARMD (5/11/2015)   Next INR check 8/14/2017   Target end date Indefinite    Indications   Pulmonary embolism (CMS-HCC) [I26.99]  Long term (current) use of anticoagulants [Z79.01] [Z79.01]         Anticoagulation Episode Summary     INR check location Home Draw    Preferred lab     Send INR reminders to     Comments       Anticoagulation Care Providers     Provider Role Specialty Phone number    El Ellis, PHARMD           Patient is subtherapeutic today. He was off his warfarin last week because he had 16 teeth pulled. Bruising noted to his left chin. Denies any medication or diet changes. No current symptoms of bleeding or thrombosis reported. He administered his3 last Lovenox injection last night and doesn't want to continue them. He understands the risk. Will give two loading doses then continue current regimen. Follow up in 2 weeks per pt's preference.    Next Appointment: Monday, August 14, 2017 at 7:30 am.      Kylee OSHEA

## 2017-07-31 NOTE — MR AVS SNAPSHOT
Tobias Morin   2017 7:30 AM   Anticoagulation Visit   MRN: [id number]    Department:  Vascular Medicine   Dept Phone:  [phone]    Description:  Male : 1955   Provider:  [facility] EXAM 5           Allergies as of 2017     Allergen Noted Reactions    Levofloxacin 10/23/2015   Rash, Itching    rash      You were diagnosed with     Long term (current) use of anticoagulants   [V58.61.ICD-9-CM]       Other pulmonary embolism without acute cor pulmonale, unspecified chronicity (CMS-HCC)   [0700631]         Vital Signs     Smoking Status                   Former Smoker           Basic Information     Date Of Birth Sex Race Ethnicity Preferred Language    1955 Male White Non- English      Your appointments     Aug 14, 2017  7:30 AM   Established Patient with [facility] EXAM 5   [facility]  (--)    [address] [place] [id number]   [phone]              Problem List              ICD-10-CM Priority Class Noted - Resolved    Diabetes mellitus type 2 in obese (CMS-HCC) E11.9, E66.9 Low  2011 - Present    Morbid obesity (CMS-HCC) E66.01 Low  2011 - Present    COREY on CPAP G47.33, Z99.89 Low  2011 - Present    Pulmonary embolism, bilateral (CMS-HCC) I26.99   2011 - Present    Lymphedema I89.0   2011 - Present    Benign prostatic hypertrophy N40.0   2011 - Present    Pulmonary embolism (CMS-HCC) I26.99   2012 - Present    Pulmonary HTN (CMS-HCC) I27.2   9/15/2012 - Present    ARF (acute renal failure) (CMS-HCC) N17.9 Medium  9/15/2012 - Present    COPD (chronic obstructive pulmonary disease) (CMS-HCC) J44.9 Medium  9/15/2012 - Present    PUD (peptic ulcer disease) K27.9   9/15/2012 - Present    Rotator cuff (capsule) sprain S43.429A   2013 - Present    Septic shock (CMS-HCC) A41.9, R65.21 High  10/11/2013 - Present    UTI (urinary tract infection) N39.0 High  10/12/2013 - Present    History  of pulmonary embolism Z86.711 Medium  10/12/2013 - Present    Hypomagnesemia E83.42 High  10/12/2013 - Present    Chronic anticoagulation Z79.01 High  10/12/2013 - Present    Hypophosphatemia E83.39 Medium  10/12/2013 - Present    Sepsis (CMS-HCC) A41.9 High  10/23/2015 - Present    Lower urinary tract infectious disease N39.0   10/24/2015 - Present    Hypokalemia E87.6   10/24/2015 - Present    Hyponatremia E87.1   10/24/2015 - Present    Long term (current) use of anticoagulants [Z79.01] Z79.01   6/12/2017 - Present      Health Maintenance        Date Due Completion Dates    DIABETES MONOFILAMENT / LE EXAM 4/16/1955 ---    RETINAL SCREENING 10/16/1972 ---    IMM DTaP/Tdap/Td Vaccine (1 - Tdap) 10/16/1973 ---    IMM ZOSTER VACCINE 10/16/2014 ---    URINE ACR / MICROALBUMIN 11/15/2015 11/15/2014, 7/5/2014, 10/8/2013, 1/26/2013    IMM INFLUENZA (1) 9/1/2017 11/11/2015, 10/12/2013, 9/12/2012    A1C SCREENING 11/13/2017 5/13/2017, 2/11/2017, 11/5/2016, 11/10/2015, 12/22/2011, 11/22/2011    FASTING LIPID PROFILE 5/13/2018 5/13/2017, 2/11/2017, 11/5/2016, 7/9/2016, 4/23/2016, 1/9/2016, 10/17/2015, 5/9/2015, 11/15/2014, 7/5/2014, 3/29/2014, 12/14/2013, 10/10/2013, 9/28/2013, 6/15/2013, 3/30/2013, 12/1/2012, 6/9/2012, 3/10/2012, 11/22/2011    SERUM CREATININE 5/13/2018 5/13/2017, 2/11/2017, 11/5/2016, 7/9/2016, 4/23/2016, 1/9/2016, 11/11/2015, 11/10/2015, 11/9/2015, 10/26/2015, 10/24/2015, 10/23/2015, 10/23/2015, 10/17/2015, 6/20/2015, 5/9/2015, 11/15/2014, 11/15/2014, 7/5/2014, 3/29/2014, 12/14/2013, 10/13/2013, 10/12/2013, 10/10/2013, 10/8/2013, 9/28/2013, 8/23/2013, 6/15/2013, 3/30/2013, 1/26/2013, 12/1/2012, 9/22/2012, 9/19/2012, 9/18/2012, 9/17/2012, 9/16/2012, 9/15/2012, 9/14/2012, 9/13/2012, 9/12/2012, 6/9/2012, 3/16/2012, 3/10/2012, 1/11/2012, 1/10/2012, 1/9/2012, 1/8/2012, 1/7/2012, 1/5/2012, 1/5/2012, 1/4/2012, 1/3/2012, 1/3/2012, 12/29/2011, 12/28/2011, 12/27/2011, 12/26/2011, 12/25/2011, 12/24/2011, 12/23/2011,  12/22/2011, 12/21/2011, 11/22/2011    COLONOSCOPY 3/16/2022 3/16/2012            Results     POCT Protime      Component    INR    1.2                        Current Immunizations     Influenza TIV (IM) 10/12/2013 10:29 AM, 9/12/2012  6:11 PM    Influenza Vaccine Quad Inj (Preserved) 11/11/2015  2:00 PM    Pneumococcal polysaccharide vaccine (PPSV-23) 9/12/2012  6:15 PM      Below and/or attached are the medications your provider expects you to take. Review all of your home medications and newly ordered medications with your provider and/or pharmacist. Follow medication instructions as directed by your provider and/or pharmacist. Please keep your medication list with you and share with your provider. Update the information when medications are discontinued, doses are changed, or new medications (including over-the-counter products) are added; and carry medication information at all times in the event of emergency situations     Allergies:  LEVOFLOXACIN - Rash,Itching               Medications  Valid as of: July 31, 2017 -  7:39 AM    Generic Name Brand Name Tablet Size Instructions for use    Atorvastatin Calcium (Tab) LIPITOR 10 MG Take 10 mg by mouth every evening.        Cefdinir (Cap) OMNICEF 300 MG Take 1 Cap by mouth 2 times a day.        Enoxaparin Sodium (Solution) LOVENOX 150 MG/ML Inject 150 mg as instructed every 12 hours.        Fenofibrate (Tab) TRIGLIDE 160 MG Take 160 mg by mouth every bedtime.        Lactobacillus (Pack) LACTINEX/FLORANEX  Take 1 Packet by mouth 3 times a day, with meals.        Lisinopril (Tab) PRINIVIL 10 MG Take 10 mg by mouth every bedtime. Indications: High Blood Pressure        Metoprolol Tartrate (Tab) LOPRESSOR 25 MG Take 25 mg by mouth every evening.        Pantoprazole Sodium (Tablet Delayed Response) PROTONIX 40 MG Take 40 mg by mouth 2 times a day.        Sertraline HCl (Tab) ZOLOFT 50 MG Take 50 mg by mouth every bedtime.        Tamsulosin HCl (Cap) FLOMAX 0.4 MG Take  0.4 mg by mouth every bedtime. Indications: Enlarged Prostate with Urination Problems        Warfarin Sodium (Tab) COUMADIN 3 MG Take one to one and one-half (1-1.5) tablets daily as directed by [facility]        .                 Medicines prescribed today were sent to:     [id number] - [place], [place] - [address] [facility]    [address] [facility] [place] [address]    Phone: [phone] Fax: [phone]    Open 24 Hours?: No      Medication refill instructions:       If your prescription bottle indicates you have medication refills left, it is not necessary to call your provider’s office. Please contact your pharmacy and they will refill your medication.    If your prescription bottle indicates you do not have any refills left, you may request refills at any time through one of the following ways: The online [employer] system (except Urgent Care), by calling your provider’s office, or by asking your pharmacy to contact your provider’s office with a refill request. Medication refills are processed only during regular business hours and may not be available until the next business day. Your provider may request additional information or to have a follow-up visit with you prior to refilling your medication.   *Please Note: Medication refills are assigned a new Rx number when refilled electronically. Your pharmacy may indicate that no refills were authorized even though a new prescription for the same medication is available at the pharmacy. Please request the medicine by name with the pharmacy before contacting your provider for a refill.        Warfarin Dosing Calendar   July 2017 Details    Sun Mon Tue Wed Thu Fri Sat           1                 2               3               4               5               6               7               8                 9               10               11               12               13               14               15                 16               17               18                19               20               21               22                 23               24               25               26               27               28               29                 30               31   1.2   6 mg   See details            Date Details   07/31 This INR check   INR: 1.2               How to take your warfarin dose     To take:  6 mg Take 2 of the 3 mg tablets.           Warfarin Dosing Calendar   August 2017 Details    Sun Mon Tue Wed Thu Fri Sat       1      6 mg         2      3 mg         3      4.5 mg         4      4.5 mg         5      4.5 mg           6      4.5 mg         7      4.5 mg         8      4.5 mg         9      3 mg         10      4.5 mg         11      4.5 mg         12      4.5 mg           13      4.5 mg         14      4.5 mg         15               16               17               18               19                 20               21               22               23               24               25               26                 27               28               29               30               31                  Date Details   No additional details    Date of next INR:  8/14/2017         How to take your warfarin dose     To take:  3 mg Take 1 of the 3 mg tablets.    To take:  4.5 mg Take 1.5 of the 3 mg tablets.    To take:  6 mg Take 2 of the 3 mg tablets.              MyChart Status: Patient Declined

## 2017-08-01 ENCOUNTER — APPOINTMENT (OUTPATIENT)
Dept: VASCULAR LAB | Facility: MEDICAL CENTER | Age: 62
End: 2017-08-01
Attending: INTERNAL MEDICINE
Payer: COMMERCIAL

## 2017-08-08 LAB — INR BLD: 1.2 (ref 0.9–1.2)

## 2017-08-14 ENCOUNTER — ANTICOAGULATION VISIT (OUTPATIENT)
Dept: VASCULAR LAB | Facility: MEDICAL CENTER | Age: 62
End: 2017-08-14
Attending: INTERNAL MEDICINE
Payer: COMMERCIAL

## 2017-08-14 VITALS — HEART RATE: 76 BPM | SYSTOLIC BLOOD PRESSURE: 115 MMHG | DIASTOLIC BLOOD PRESSURE: 69 MMHG

## 2017-08-14 DIAGNOSIS — Z79.01 LONG TERM (CURRENT) USE OF ANTICOAGULANTS: ICD-10-CM

## 2017-08-14 LAB
INR BLD: 2.6 (ref 0.9–1.2)
INR PPP: 2.6 (ref 2–3.5)

## 2017-08-14 PROCEDURE — 99211 OFF/OP EST MAY X REQ PHY/QHP: CPT

## 2017-08-14 PROCEDURE — 85610 PROTHROMBIN TIME: CPT

## 2017-08-14 NOTE — PROGRESS NOTES
Anticoagulation Summary as of 8/14/2017     INR goal 2.0-3.0   Selected INR 2.6 (8/14/2017)   Maintenance plan 3 mg (3 mg x 1) on Wed; 4.5 mg (3 mg x 1.5) all other days   Weekly total 30 mg   Plan last modified Maci Pathak, PHARMD (5/11/2015)   Next INR check 9/25/2017   Target end date Indefinite    Indications   Pulmonary embolism (CMS-HCC) [I26.99]  Long term (current) use of anticoagulants [Z79.01] [Z79.01]         Anticoagulation Episode Summary     INR check location Home Draw    Preferred lab     Send INR reminders to     Comments       Anticoagulation Care Providers     Provider Role Specialty Phone number    El Ellis, PHARMD           Anticoagulation Patient Findings      Tobias Morin seen in clinic today  INR therapeutic.    Denies signs/symptoms of bleeding and/or thrombosis.    Denies changes to diet or medications.   Follow up appointment in 6 week(s).    Pt is to continue with current warfarin dosing regimen.     El Ellis, PHARMD

## 2017-08-14 NOTE — MR AVS SNAPSHOT
Tobias GARCIA Morin   2017 7:30 AM   Anticoagulation Visit   MRN: [id number]    Department:  Vascular Medicine   Dept Phone:  [phone]    Description:  Male : 1955   Provider:  [facility] EXAM 5           Allergies as of 2017     Allergen Noted Reactions    Levofloxacin 10/23/2015   Rash, Itching    rash      You were diagnosed with     Long term (current) use of anticoagulants   [V58.61.ICD-9-CM]         Vital Signs     Blood Pressure Pulse Smoking Status             115/69 mmHg 76 Former Smoker         Basic Information     Date Of Birth Sex Race Ethnicity Preferred Language    1955 Male White Non- English      Your appointments     Sep 25, 2017  7:30 AM   Established Patient with [facility] EXAM 4   [facility]  (--)    [address] [place] [id number]   [phone]              Problem List              ICD-10-CM Priority Class Noted - Resolved    Diabetes mellitus type 2 in obese (CMS-HCC) E11.9, E66.9 Low  2011 - Present    Morbid obesity (CMS-HCC) E66.01 Low  2011 - Present    COREY on CPAP G47.33, Z99.89 Low  2011 - Present    Pulmonary embolism, bilateral (CMS-HCC) I26.99   2011 - Present    Lymphedema I89.0   2011 - Present    Benign prostatic hypertrophy N40.0   2011 - Present    Pulmonary embolism (CMS-HCC) I26.99   2012 - Present    Pulmonary HTN (CMS-HCC) I27.2   9/15/2012 - Present    ARF (acute renal failure) (CMS-HCC) N17.9 Medium  9/15/2012 - Present    COPD (chronic obstructive pulmonary disease) (CMS-HCC) J44.9 Medium  9/15/2012 - Present    PUD (peptic ulcer disease) K27.9   9/15/2012 - Present    Rotator cuff (capsule) sprain S43.429A   2013 - Present    Septic shock (CMS-HCC) A41.9, R65.21 High  10/11/2013 - Present    UTI (urinary tract infection) N39.0 High  10/12/2013 - Present    History of pulmonary embolism Z86.711 Medium  10/12/2013 - Present    Hypomagnesemia  E83.42 High  10/12/2013 - Present    Chronic anticoagulation Z79.01 High  10/12/2013 - Present    Hypophosphatemia E83.39 Medium  10/12/2013 - Present    Sepsis (CMS-HCC) A41.9 High  10/23/2015 - Present    Lower urinary tract infectious disease N39.0   10/24/2015 - Present    Hypokalemia E87.6   10/24/2015 - Present    Hyponatremia E87.1   10/24/2015 - Present    Long term (current) use of anticoagulants [Z79.01] Z79.01   6/12/2017 - Present      Health Maintenance        Date Due Completion Dates    DIABETES MONOFILAMENT / LE EXAM 4/16/1955 ---    RETINAL SCREENING 10/16/1972 ---    IMM DTaP/Tdap/Td Vaccine (1 - Tdap) 10/16/1973 ---    IMM ZOSTER VACCINE 10/16/2014 ---    URINE ACR / MICROALBUMIN 11/15/2015 11/15/2014, 7/5/2014, 10/8/2013, 1/26/2013    IMM INFLUENZA (1) 9/1/2017 11/11/2015, 10/12/2013, 9/12/2012    A1C SCREENING 11/13/2017 5/13/2017, 2/11/2017, 11/5/2016, 11/10/2015, 12/22/2011, 11/22/2011    FASTING LIPID PROFILE 5/13/2018 5/13/2017, 2/11/2017, 11/5/2016, 7/9/2016, 4/23/2016, 1/9/2016, 10/17/2015, 5/9/2015, 11/15/2014, 7/5/2014, 3/29/2014, 12/14/2013, 10/10/2013, 9/28/2013, 6/15/2013, 3/30/2013, 12/1/2012, 6/9/2012, 3/10/2012, 11/22/2011    SERUM CREATININE 5/13/2018 5/13/2017, 2/11/2017, 11/5/2016, 7/9/2016, 4/23/2016, 1/9/2016, 11/11/2015, 11/10/2015, 11/9/2015, 10/26/2015, 10/24/2015, 10/23/2015, 10/23/2015, 10/17/2015, 6/20/2015, 5/9/2015, 11/15/2014, 11/15/2014, 7/5/2014, 3/29/2014, 12/14/2013, 10/13/2013, 10/12/2013, 10/10/2013, 10/8/2013, 9/28/2013, 8/23/2013, 6/15/2013, 3/30/2013, 1/26/2013, 12/1/2012, 9/22/2012, 9/19/2012, 9/18/2012, 9/17/2012, 9/16/2012, 9/15/2012, 9/14/2012, 9/13/2012, 9/12/2012, 6/9/2012, 3/16/2012, 3/10/2012, 1/11/2012, 1/10/2012, 1/9/2012, 1/8/2012, 1/7/2012, 1/5/2012, 1/5/2012, 1/4/2012, 1/3/2012, 1/3/2012, 12/29/2011, 12/28/2011, 12/27/2011, 12/26/2011, 12/25/2011, 12/24/2011, 12/23/2011, 12/22/2011, 12/21/2011, 11/22/2011    COLONOSCOPY 3/16/2022 3/16/2012              Results     POCT Protime      Component    INR    2.6                        Current Immunizations     Influenza TIV (IM) 10/12/2013 10:29 AM, 9/12/2012  6:11 PM    Influenza Vaccine Quad Inj (Preserved) 11/11/2015  2:00 PM    Pneumococcal polysaccharide vaccine (PPSV-23) 9/12/2012  6:15 PM      Below and/or attached are the medications your provider expects you to take. Review all of your home medications and newly ordered medications with your provider and/or pharmacist. Follow medication instructions as directed by your provider and/or pharmacist. Please keep your medication list with you and share with your provider. Update the information when medications are discontinued, doses are changed, or new medications (including over-the-counter products) are added; and carry medication information at all times in the event of emergency situations     Allergies:  LEVOFLOXACIN - Rash,Itching               Medications  Valid as of: August 14, 2017 -  7:37 AM    Generic Name Brand Name Tablet Size Instructions for use    Atorvastatin Calcium (Tab) LIPITOR 10 MG Take 10 mg by mouth every evening.        Cefdinir (Cap) OMNICEF 300 MG Take 1 Cap by mouth 2 times a day.        Enoxaparin Sodium (Solution) LOVENOX 150 MG/ML Inject 150 mg as instructed every 12 hours.        Fenofibrate (Tab) TRIGLIDE 160 MG Take 160 mg by mouth every bedtime.        Lactobacillus (Pack) LACTINEX/FLORANEX  Take 1 Packet by mouth 3 times a day, with meals.        Lisinopril (Tab) PRINIVIL 10 MG Take 10 mg by mouth every bedtime. Indications: High Blood Pressure        Metoprolol Tartrate (Tab) LOPRESSOR 25 MG Take 25 mg by mouth every evening.        Pantoprazole Sodium (Tablet Delayed Response) PROTONIX 40 MG Take 40 mg by mouth 2 times a day.        Sertraline HCl (Tab) ZOLOFT 50 MG Take 50 mg by mouth every bedtime.        Tamsulosin HCl (Cap) FLOMAX 0.4 MG Take 0.4 mg by mouth every bedtime. Indications: Enlarged Prostate with Urination  Problems        Warfarin Sodium (Tab) COUMADIN 3 MG Take one to one and one-half (1-1.5) tablets daily as directed by [facility]        .                 Medicines prescribed today were sent to:     [id number] - [place], [place] - [address]    [address] [place] [address]    Phone: [phone] Fax: [phone]    Open 24 Hours?: No      Medication refill instructions:       If your prescription bottle indicates you have medication refills left, it is not necessary to call your provider’s office. Please contact your pharmacy and they will refill your medication.    If your prescription bottle indicates you do not have any refills left, you may request refills at any time through one of the following ways: The online [employer] system (except Urgent Care), by calling your provider’s office, or by asking your pharmacy to contact your provider’s office with a refill request. Medication refills are processed only during regular business hours and may not be available until the next business day. Your provider may request additional information or to have a follow-up visit with you prior to refilling your medication.   *Please Note: Medication refills are assigned a new Rx number when refilled electronically. Your pharmacy may indicate that no refills were authorized even though a new prescription for the same medication is available at the pharmacy. Please request the medicine by name with the pharmacy before contacting your provider for a refill.        Warfarin Dosing Calendar   August 2017 Details    Sun Mon Tue Wed Thu Fri Sat       1               2               3               4               5                 6               7               8               9               10               11               12                 13               14   2.6   4.5 mg   See details      15      4.5 mg         16      3 mg         17      4.5 mg         18      4.5 mg         19      4.5 mg            20      4.5 mg         21      4.5 mg         22      4.5 mg         23      3 mg         24      4.5 mg         25      4.5 mg         26      4.5 mg           27      4.5 mg         28      4.5 mg         29      4.5 mg         30      3 mg         31      4.5 mg            Date Details   08/14 This INR check   INR: 2.6               How to take your warfarin dose     To take:  3 mg Take 1 of the 3 mg tablets.    To take:  4.5 mg Take 1.5 of the 3 mg tablets.           Warfarin Dosing Calendar   September 2017 Details    Sun Mon Tue Wed Thu Fri Sat          1      4.5 mg         2      4.5 mg           3      4.5 mg         4      4.5 mg         5      4.5 mg         6      3 mg         7      4.5 mg         8      4.5 mg         9      4.5 mg           10      4.5 mg         11      4.5 mg         12      4.5 mg         13      3 mg         14      4.5 mg         15      4.5 mg         16      4.5 mg           17      4.5 mg         18      4.5 mg         19      4.5 mg         20      3 mg         21      4.5 mg         22      4.5 mg         23      4.5 mg           24      4.5 mg         25      4.5 mg         26               27               28               29               30                Date Details   No additional details    Date of next INR:  9/25/2017         How to take your warfarin dose     To take:  3 mg Take 1 of the 3 mg tablets.    To take:  4.5 mg Take 1.5 of the 3 mg tablets.              MyChart Status: Patient Declined

## 2017-08-16 ENCOUNTER — HOSPITAL ENCOUNTER (OUTPATIENT)
Dept: LAB | Facility: MEDICAL CENTER | Age: 62
End: 2017-08-16
Attending: FAMILY MEDICINE
Payer: COMMERCIAL

## 2017-08-16 ENCOUNTER — HOSPITAL ENCOUNTER (OUTPATIENT)
Facility: MEDICAL CENTER | Age: 62
End: 2017-08-16
Attending: NURSE PRACTITIONER
Payer: COMMERCIAL

## 2017-08-16 LAB
ALBUMIN SERPL BCP-MCNC: 3.6 G/DL (ref 3.2–4.9)
ALBUMIN/GLOB SERPL: 1 G/DL
ALP SERPL-CCNC: 34 U/L (ref 30–99)
ALT SERPL-CCNC: 10 U/L (ref 2–50)
ANION GAP SERPL CALC-SCNC: 10 MMOL/L (ref 0–11.9)
AST SERPL-CCNC: 11 U/L (ref 12–45)
BILIRUB SERPL-MCNC: 0.9 MG/DL (ref 0.1–1.5)
BUN SERPL-MCNC: 13 MG/DL (ref 8–22)
CALCIUM SERPL-MCNC: 9.3 MG/DL (ref 8.5–10.5)
CHLORIDE SERPL-SCNC: 104 MMOL/L (ref 96–112)
CHOLEST SERPL-MCNC: 116 MG/DL (ref 100–199)
CO2 SERPL-SCNC: 18 MMOL/L (ref 20–33)
CREAT SERPL-MCNC: 1.37 MG/DL (ref 0.5–1.4)
EST. AVERAGE GLUCOSE BLD GHB EST-MCNC: 174 MG/DL
GFR SERPL CREATININE-BSD FRML MDRD: 53 ML/MIN/1.73 M 2
GLOBULIN SER CALC-MCNC: 3.5 G/DL (ref 1.9–3.5)
GLUCOSE SERPL-MCNC: 149 MG/DL (ref 65–99)
HBA1C MFR BLD: 7.7 % (ref 0–5.6)
HDLC SERPL-MCNC: 32 MG/DL
LDLC SERPL CALC-MCNC: 61 MG/DL
POTASSIUM SERPL-SCNC: 4 MMOL/L (ref 3.6–5.5)
PROT SERPL-MCNC: 7.1 G/DL (ref 6–8.2)
SODIUM SERPL-SCNC: 132 MMOL/L (ref 135–145)
TRIGL SERPL-MCNC: 114 MG/DL (ref 0–149)

## 2017-08-16 PROCEDURE — 36415 COLL VENOUS BLD VENIPUNCTURE: CPT

## 2017-08-16 PROCEDURE — 81001 URINALYSIS AUTO W/SCOPE: CPT

## 2017-08-16 PROCEDURE — 87186 SC STD MICRODIL/AGAR DIL: CPT

## 2017-08-16 PROCEDURE — 83036 HEMOGLOBIN GLYCOSYLATED A1C: CPT

## 2017-08-16 PROCEDURE — 87077 CULTURE AEROBIC IDENTIFY: CPT

## 2017-08-16 PROCEDURE — 80053 COMPREHEN METABOLIC PANEL: CPT

## 2017-08-16 PROCEDURE — 80061 LIPID PANEL: CPT

## 2017-08-16 PROCEDURE — 87086 URINE CULTURE/COLONY COUNT: CPT

## 2017-08-17 LAB
APPEARANCE UR: CLEAR
BACTERIA #/AREA URNS HPF: ABNORMAL /HPF
BILIRUB UR QL STRIP.AUTO: NEGATIVE
COLOR UR: YELLOW
CULTURE IF INDICATED INDCX: YES UA CULTURE
EPI CELLS #/AREA URNS HPF: ABNORMAL /HPF
GLUCOSE UR STRIP.AUTO-MCNC: 100 MG/DL
KETONES UR STRIP.AUTO-MCNC: NEGATIVE MG/DL
LEUKOCYTE ESTERASE UR QL STRIP.AUTO: ABNORMAL
MICRO URNS: ABNORMAL
NITRITE UR QL STRIP.AUTO: NEGATIVE
PH UR STRIP.AUTO: 7 [PH]
PROT UR QL STRIP: 30 MG/DL
RBC # URNS HPF: ABNORMAL /HPF
RBC UR QL AUTO: ABNORMAL
SP GR UR STRIP.AUTO: 1.01
WBC #/AREA URNS HPF: ABNORMAL /HPF

## 2017-08-19 ENCOUNTER — HOSPITAL ENCOUNTER (OUTPATIENT)
Dept: LAB | Facility: MEDICAL CENTER | Age: 62
End: 2017-08-19
Attending: PHYSICIAN ASSISTANT
Payer: COMMERCIAL

## 2017-08-19 LAB
BACTERIA UR CULT: ABNORMAL
PSA SERPL-MCNC: 3.56 NG/ML (ref 0–4)
SIGNIFICANT IND 70042: ABNORMAL
SOURCE SOURCE: ABNORMAL

## 2017-08-19 PROCEDURE — 36415 COLL VENOUS BLD VENIPUNCTURE: CPT

## 2017-08-19 PROCEDURE — 84153 ASSAY OF PSA TOTAL: CPT

## 2017-09-22 ENCOUNTER — TELEPHONE (OUTPATIENT)
Dept: VASCULAR LAB | Facility: MEDICAL CENTER | Age: 62
End: 2017-09-22

## 2017-09-22 NOTE — TELEPHONE ENCOUNTER
[facility]    Referral Request sent to Dr Ema Lucero.    El Ellis, PharmD

## 2017-09-23 ENCOUNTER — HOSPITAL ENCOUNTER (OUTPATIENT)
Dept: LAB | Facility: MEDICAL CENTER | Age: 62
End: 2017-09-23
Attending: PHYSICIAN ASSISTANT
Payer: COMMERCIAL

## 2017-09-23 LAB — PSA SERPL-MCNC: 0.63 NG/ML (ref 0–4)

## 2017-09-23 PROCEDURE — 84153 ASSAY OF PSA TOTAL: CPT

## 2017-09-23 PROCEDURE — 36415 COLL VENOUS BLD VENIPUNCTURE: CPT

## 2017-09-25 ENCOUNTER — ANTICOAGULATION VISIT (OUTPATIENT)
Dept: VASCULAR LAB | Facility: MEDICAL CENTER | Age: 62
End: 2017-09-25
Attending: INTERNAL MEDICINE
Payer: COMMERCIAL

## 2017-09-25 VITALS — HEART RATE: 62 BPM | SYSTOLIC BLOOD PRESSURE: 126 MMHG | DIASTOLIC BLOOD PRESSURE: 65 MMHG

## 2017-09-25 DIAGNOSIS — Z79.01 LONG TERM (CURRENT) USE OF ANTICOAGULANTS: ICD-10-CM

## 2017-09-25 DIAGNOSIS — I26.99 OTHER PULMONARY EMBOLISM WITHOUT ACUTE COR PULMONALE, UNSPECIFIED CHRONICITY (HCC): ICD-10-CM

## 2017-09-25 LAB
INR BLD: 3.9 (ref 0.9–1.2)
INR PPP: 3.9 (ref 2–3.5)

## 2017-09-25 PROCEDURE — 99212 OFFICE O/P EST SF 10 MIN: CPT | Performed by: NURSE PRACTITIONER

## 2017-09-25 PROCEDURE — 85610 PROTHROMBIN TIME: CPT

## 2017-09-25 NOTE — PROGRESS NOTES
Anticoagulation Summary  As of 9/25/2017    INR goal:   2.0-3.0   TTR:   83.1 % (2.2 y)   Today's INR:   3.9!   Maintenance plan:   3 mg (3 mg x 1) on Wed; 4.5 mg (3 mg x 1.5) all other days   Weekly total:   30 mg   Plan last modified:   Maci Pathak, PharmD (5/11/2015)   Next INR check:   10/23/2017   Target end date:   Indefinite    Indications    Pulmonary embolism (CMS-HCC) [I26.99]  Long term (current) use of anticoagulants [Z79.01] [Z79.01]             Anticoagulation Episode Summary     INR check location:   [facility]    Preferred lab:       Send INR reminders to:       Comments:         Anticoagulation Care Providers     Provider Role Specialty Phone number    Ema Lucero D.O. Referring Family Medicine [phone]    [facility] Responsible  [phone]        Anticoagulation Patient Findings      HPI:  Tobias Morin seen in clinic today for follow up on anticoagulation therapy in the presence of chronic anticoagulation. Denies any changes to current medical/health status since last appointment. Had to alcoholic drinks yesterday. Denies any medication or diet changes. No current symptoms of bleeding or thrombosis reported.    A/P:   INR supratherapeutic. HOLD tonight's dose then continue current regimen. BP recorded in vitals.    Follow up appointment in 4 week(s) per pt's preference.    Next Appointment: Tuesday, October 24, 2017 at 7:30 am.     Kylee OSHEA

## 2017-09-27 ENCOUNTER — TELEPHONE (OUTPATIENT)
Dept: VASCULAR LAB | Facility: MEDICAL CENTER | Age: 62
End: 2017-09-27

## 2017-09-27 NOTE — TELEPHONE ENCOUNTER
[facility]    Referral request sent to Dr Ema Lucero.    El Ellis, PharmD

## 2017-10-24 ENCOUNTER — APPOINTMENT (OUTPATIENT)
Dept: VASCULAR LAB | Facility: MEDICAL CENTER | Age: 62
End: 2017-10-24
Attending: INTERNAL MEDICINE
Payer: COMMERCIAL

## 2017-10-24 VITALS — SYSTOLIC BLOOD PRESSURE: 120 MMHG | HEART RATE: 65 BPM | DIASTOLIC BLOOD PRESSURE: 75 MMHG

## 2017-10-24 DIAGNOSIS — I26.99 OTHER PULMONARY EMBOLISM WITHOUT ACUTE COR PULMONALE, UNSPECIFIED CHRONICITY (HCC): ICD-10-CM

## 2017-10-24 DIAGNOSIS — Z79.01 LONG TERM CURRENT USE OF ANTICOAGULANT THERAPY: ICD-10-CM

## 2017-10-24 DIAGNOSIS — I26.99 PULMONARY EMBOLISM, BILATERAL (HCC): ICD-10-CM

## 2017-10-24 LAB
INR BLD: 3.8 (ref 0.9–1.2)
INR PPP: 3.8 (ref 2–3.5)

## 2017-10-24 PROCEDURE — 85610 PROTHROMBIN TIME: CPT

## 2017-10-24 PROCEDURE — 99212 OFFICE O/P EST SF 10 MIN: CPT

## 2017-10-24 NOTE — PROGRESS NOTES
[facility] Anticoagulation Service Note    Date: 10/24/2017  Vitals:    10/24/17 0842   BP: 120/75   Pulse: 65       Anticoagulation Summary  As of 10/24/2017    INR goal:   2.0-3.0   TTR:   80.1 % (2.3 y)   Today's INR:   3.8!   Maintenance plan:   3 mg (3 mg x 1) on Sun, Wed; 4.5 mg (3 mg x 1.5) all other days   Weekly total:   28.5 mg   Plan last modified:   Maci Pathak, PharmD (10/24/2017)   Next INR check:   11/21/2017   Target end date:   Indefinite    Indications    Pulmonary embolism (CMS-HCC) [I26.99]  Long term (current) use of anticoagulants [Z79.01] [Z79.01]             Anticoagulation Episode Summary     INR check location:   [facility]    Preferred lab:       Send INR reminders to:       Comments:   see referral under media from ellen brooks DO       Anticoagulation Care Providers     Provider Role Specialty Phone number    TREY TaylorO. Referring Family Medicine [phone]    [facility] Responsible  [phone]        Anticoagulation Patient Findings      HPI:   Tobias Morin seen in clinic today, on anticoagulation therapy with warfarin for hx of PE  Confirmed warfarin dosing regimen  Changes to current medical/health status since last appt:   Denies signs/symptoms of bleeding and/or thrombosis since the last appt.    Denies any interval changes to diet  Denies any interval changes to medications since last appt.   Denies any complications or cost restrictions with current therapy.   His wife recently passed away    A/P   INR  supra-therapeutic.   Tonight HOLD x 1 dose then begin decreased weekly regimen    Follow up appointment in 4 week(s) per pt preference  Maci Pathak, BrooksD

## 2017-11-20 ENCOUNTER — ANTICOAGULATION VISIT (OUTPATIENT)
Dept: VASCULAR LAB | Facility: MEDICAL CENTER | Age: 62
End: 2017-11-20
Attending: INTERNAL MEDICINE
Payer: COMMERCIAL

## 2017-11-20 VITALS — DIASTOLIC BLOOD PRESSURE: 52 MMHG | HEART RATE: 56 BPM | SYSTOLIC BLOOD PRESSURE: 123 MMHG

## 2017-11-20 DIAGNOSIS — I26.99 OTHER PULMONARY EMBOLISM WITHOUT ACUTE COR PULMONALE, UNSPECIFIED CHRONICITY (HCC): ICD-10-CM

## 2017-11-20 DIAGNOSIS — Z79.01 LONG TERM CURRENT USE OF ANTICOAGULANT THERAPY: ICD-10-CM

## 2017-11-20 LAB
INR BLD: 4 (ref 0.9–1.2)
INR PPP: 4 (ref 2–3.5)

## 2017-11-20 PROCEDURE — 99212 OFFICE O/P EST SF 10 MIN: CPT

## 2017-11-20 PROCEDURE — 85610 PROTHROMBIN TIME: CPT

## 2017-11-20 NOTE — PROGRESS NOTES
Anticoagulation Summary  As of 11/20/2017    INR goal:   2.0-3.0   TTR:   77.6 % (2.3 y)   Today's INR:   4.0!   Maintenance plan:   4.5 mg (3 mg x 1.5) on Mon, Wed, Fri; 3 mg (3 mg x 1) all other days   Weekly total:   25.5 mg   Plan last modified:   Penelope Rojo, PharmD (11/20/2017)   Next INR check:   12/4/2017   Target end date:   Indefinite    Indications    Pulmonary embolism (CMS-HCC) [I26.99]  Long term (current) use of anticoagulants [Z79.01] [Z79.01]             Anticoagulation Episode Summary     INR check location:   [facility]    Preferred lab:       Send INR reminders to:       Comments:   see referral under media from ellen lucero DO       Anticoagulation Care Providers     Provider Role Specialty Phone number    Ellen Lucero D.O. Referring Family Medicine [phone]    [facility] Responsible  [phone]        Anticoagulation Patient Findings  Patient Findings     Positives:   Change in alcohol use, Change in diet/appetite    Negatives:   Signs/symptoms of thrombosis, Signs/symptoms of bleeding, Laboratory test error suspected, Change in health, Change in activity, Upcoming invasive procedure, Emergency department visit, Upcoming dental procedure, Missed doses, Extra doses, Change in medications, Hospital admission, Bruising, Other complaints        HPI: chronic anticoagulation due to PE and supratherapeutic INR    Spoke with patient regarding SUPRAtherapeutic INR of 4.0.     Patient denies any missed doses, any changes in medication, or any bleeding/bruising. He claims he has been eating a lot less, including less greens, and has lost over 20 pounds recently. He also has increased his alcohol use to about 2 drinks a day, which could be contributing to his elevated INR.    Instructed patient to hold today's dose and started a new reduced weekly regimen (10% weekly reduction). Instructed patient to contact the clinic with any concerns. Follow up in 2  weeks.    Penelope Rojo, PharmD

## 2017-12-18 ENCOUNTER — ANTICOAGULATION VISIT (OUTPATIENT)
Dept: VASCULAR LAB | Facility: MEDICAL CENTER | Age: 62
End: 2017-12-18
Attending: INTERNAL MEDICINE
Payer: COMMERCIAL

## 2017-12-18 DIAGNOSIS — Z79.01 LONG TERM CURRENT USE OF ANTICOAGULANT THERAPY: ICD-10-CM

## 2017-12-18 DIAGNOSIS — I26.99 OTHER PULMONARY EMBOLISM WITHOUT ACUTE COR PULMONALE, UNSPECIFIED CHRONICITY (HCC): ICD-10-CM

## 2017-12-18 LAB
INR BLD: 3.3 (ref 0.9–1.2)
INR PPP: 3.3 (ref 2–3.5)

## 2017-12-18 PROCEDURE — 85610 PROTHROMBIN TIME: CPT

## 2017-12-18 PROCEDURE — 99212 OFFICE O/P EST SF 10 MIN: CPT

## 2017-12-18 NOTE — PROGRESS NOTES
Anticoagulation Summary  As of 12/18/2017    INR goal:   2.0-3.0   TTR:   75.1 % (2.4 y)   Today's INR:   3.3!   Maintenance plan:   4.5 mg (3 mg x 1.5) on Mon, Fri; 3 mg (3 mg x 1) all other days   Weekly total:   24 mg   Plan last modified:   Penelope Rojo, PharmD (12/18/2017)   Next INR check:   1/2/2018   Target end date:   Indefinite    Indications    Pulmonary embolism (CMS-HCC) [I26.99]  Long term (current) use of anticoagulants [Z79.01] [Z79.01]             Anticoagulation Episode Summary     [facility] location[facility]   [facility]    Preferred lab:       Send INR reminders to:       Comments:   see referral under media from ellen lucero DO       Anticoagulation Care Providers     Provider Role Specialty Phone number    Ellen Lucero D.O. Referring Family Medicine [phone]    [facility] Responsible  [phone]        Anticoagulation Patient Findings  Patient Findings     Positives:   Change in alcohol use, Change in diet/appetite    Negatives:   Signs/symptoms of thrombosis, Signs/symptoms of bleeding, Laboratory test error suspected, Change in health, Change in activity, Upcoming invasive procedure, Emergency department visit, Upcoming dental procedure, Missed doses, Extra doses, Change in medications, Hospital admission, Bruising, Other complaints        HPI:   Seen in clinic today, on anticoagulation therapy with warfarin for pulmonary embolism    Previous INR  4.0 on 11/20/17     Does patient have any changes to current medical/health status since last appt (Y/N):  NO  Does patient have any signs/symptoms of bleeding and/or thrombosis since the last appt (Y/N):  NO  Does patient have any interval changes to diet or medications since last appt (Y/N):  YES; patient is eating less in general and eating a lot less greens than he use to, he does not want to try to incorporate greens into his diet since he does not think he can be consistent with eating them; he also has been  drinking alcohol more frequently than usual  Are there any complications or cost restrictions with current therapy (Y/N):  NO      Vitals:  Patient declines BP/vitals check today      Asssessment:       INR SUPRAtherapeutic therefore putting patient at higher risk of bleeding  Reason(s) for out of range INR today: decreased green intake and increased alcohol use     Plan:  Instructed patient to start 6% weekly reduced regimen.      Follow up:  Because warfarin is a high risk medication and current CHEST guidelines recommend regular monitoring intervals (few days up to 12 weeks), will have patient return to clinic in 2 weeks to recheck INR.    Penelope Rojo, PharmD

## 2018-01-02 ENCOUNTER — ANTICOAGULATION VISIT (OUTPATIENT)
Dept: VASCULAR LAB | Facility: MEDICAL CENTER | Age: 63
End: 2018-01-02
Attending: INTERNAL MEDICINE
Payer: COMMERCIAL

## 2018-01-02 DIAGNOSIS — Z79.01 LONG TERM CURRENT USE OF ANTICOAGULANT THERAPY: ICD-10-CM

## 2018-01-02 DIAGNOSIS — I26.99 OTHER PULMONARY EMBOLISM WITHOUT ACUTE COR PULMONALE, UNSPECIFIED CHRONICITY (HCC): ICD-10-CM

## 2018-01-02 LAB — INR PPP: 2.7 (ref 2–3.5)

## 2018-01-02 PROCEDURE — 85610 PROTHROMBIN TIME: CPT

## 2018-01-02 PROCEDURE — 99211 OFF/OP EST MAY X REQ PHY/QHP: CPT

## 2018-01-02 NOTE — PROGRESS NOTES
OP Anticoagulation Service Note    Date: 1/2/2018  There were no vitals filed for this visit.    Anticoagulation Summary  As of 1/2/2018    INR goal:   2.0-3.0   TTR:   74.7 % (2.4 y)   Today's INR:   2.7   Maintenance plan:   4.5 mg (3 mg x 1.5) on Mon, Fri; 3 mg (3 mg x 1) all other days   Weekly total:   24 mg   Plan last modified:   Penelope Rooj, PharmD (12/18/2017)   Next INR check:   1/29/2018   Target end date:   Indefinite    Indications    Pulmonary embolism (CMS-HCC) [I26.99]  Long term (current) use of anticoagulants [Z79.01] [Z79.01]             Anticoagulation Episode Summary     [facility] check [facility]   [facility]    Preferred lab:       Send INR reminders to:       Comments:   see referral under media from ellen lucero DO       Anticoagulation Care Providers     Provider Role Specialty Phone number    Ellen Lucero D.O. Referring Family Medicine [phone]    [facility] Responsible  [phone]        Anticoagulation Patient Findings      HPI:   Tobias Morin seen in clinic today, they are here today for a INR check on anticoagulation therapy with warfarin because they have a PMH of PEs    The reason for today's visit is to prevent morbidity and mortality from a blood clot  and to reduce the risk of bleeding while on a anticoagulant.     Reason for today's visit (per our collaborative practice policy) is because their last INR was 3.3 on 12/18/2017.  Intervention at the last visit:none    Additional education provided today regarding reducing bleed risk and dietary constraints: No    Any upcoming  procedures: none     Confirmed warfarin dosing regimen  Interval Changes with foods rich in vitamin K:No  Interval Changes in ETOH:  No  Interval Changes in smoking status: No  Interval Changes in medication: No   Cost restriction: No    S/S of bleeding or bruising:  No  Signs/symptoms  thrombosis since the last appt: No  Bleed risk is: low    Assessment:   INR   therapeutic.     Intervention required today to prevent:    No change in dose needed today, they will need to continue with the same dose and diet to prevent adverse events while on a anticoagulant.      Plan:  Continue weekly warfarin dose as noted    Follow up:  Follow up appointment in 4 week(s) because :  They have a TTR of 74.7 which is not at target (TTR target/goal is 100%) and requires close follow up to prevent a adverse event (the lower the TTR the higher risk of clots, strokes, or bleeding).       Other info:  Pt educated to contact our clinic with any changes in medications or s/s of bleeding or thrombosis    CHEST guidelines recommend frequent INR monitoring at regular intervals (a few days up to a max of 12 weeks) to ensure they are on the proper dose of warfarin and not having any complications from therapy.  INRs can dramatically change over a short time period due to diet, medications, and medical conditions.

## 2018-01-03 LAB — INR BLD: 2.7 (ref 0.9–1.2)

## 2018-01-28 DIAGNOSIS — Z86.711 HISTORY OF PULMONARY EMBOLISM: ICD-10-CM

## 2018-01-29 ENCOUNTER — ANTICOAGULATION VISIT (OUTPATIENT)
Dept: VASCULAR LAB | Facility: MEDICAL CENTER | Age: 63
End: 2018-01-29
Attending: INTERNAL MEDICINE
Payer: COMMERCIAL

## 2018-01-29 VITALS — SYSTOLIC BLOOD PRESSURE: 136 MMHG | DIASTOLIC BLOOD PRESSURE: 59 MMHG | HEART RATE: 46 BPM

## 2018-01-29 DIAGNOSIS — Z79.01 LONG TERM CURRENT USE OF ANTICOAGULANT THERAPY: ICD-10-CM

## 2018-01-29 DIAGNOSIS — I26.99 OTHER PULMONARY EMBOLISM WITHOUT ACUTE COR PULMONALE, UNSPECIFIED CHRONICITY (HCC): ICD-10-CM

## 2018-01-29 LAB
INR BLD: 2.3 (ref 0.9–1.2)
INR PPP: 2.3 (ref 2–3.5)

## 2018-01-29 PROCEDURE — 99211 OFF/OP EST MAY X REQ PHY/QHP: CPT

## 2018-01-29 PROCEDURE — 85610 PROTHROMBIN TIME: CPT

## 2018-01-29 RX ORDER — WARFARIN SODIUM 3 MG/1
TABLET ORAL
Qty: 135 TAB | Refills: 1 | Status: SHIPPED | OUTPATIENT
Start: 2018-01-29 | End: 2018-03-12 | Stop reason: SDUPTHER

## 2018-01-29 NOTE — PROGRESS NOTES
Anticoagulation Summary  As of 1/29/2018    INR goal:   2.0-3.0   TTR:   75.4 % (2.5 y)   Today's INR:   2.3   Maintenance plan:   4.5 mg (3 mg x 1.5) on Mon, Fri; 3 mg (3 mg x 1) all other days   Weekly total:   24 mg   Plan last modified:   Penelope Rojo, PharmD (12/18/2017)   Next INR check:   3/12/2018   Target end date:   Indefinite    Indications    Pulmonary embolism (CMS-HCC) [I26.99]  Long term (current) use of anticoagulants [Z79.01] [Z79.01]             Anticoagulation Episode Summary     [facility]   [facility]    Preferred lab:       Send INR reminders to:       Comments:   see referral under media from ellen lucero DO       Anticoagulation Care Providers     Provider Role Specialty Phone number    Ellen Lucero D.O. Referring Family Medicine [phone]    [facility] Responsible  [phone]        Anticoagulation Patient Findings  Patient Findings     Positives:   Change in medications    Negatives:   Signs/symptoms of thrombosis, Signs/symptoms of bleeding, Laboratory test error suspected, Change in health, Change in alcohol use, Change in activity, Upcoming invasive procedure, Emergency department visit, Upcoming dental procedure, Missed doses, Extra doses, Change in diet/appetite, Hospital admission, Bruising, Other complaints        HPI:   Seen in clinic today, on anticoagulation therapy with warfarin for stroke prevention due to history of PE    Previous INR  2.7 on 1/2/2018     Does patient have any changes to current medical/health status since last appt (Y/N):  NO  Does patient have any signs/symptoms of bleeding and/or thrombosis since the last appt (Y/N):  NO  Does patient have any interval changes to diet or medications since last appt (Y/N):  YES; antibiotics discontinued  Are there any complications or cost restrictions with current therapy (Y/N):  NO      Vitals:  Patient BP/vitals in chart     Asssessment:       INR therapeutic     Plan:   Instructed patient to continue with current warfarin regimen.       Follow up:  Because warfarin is a high risk medication and current CHEST guidelines recommend regular monitoring intervals (few days up to 12 weeks), will have patient return to clinic in 6 weeks to recheck INR.    Penelope Rojo, BrooksD

## 2018-02-04 ENCOUNTER — HOSPITAL ENCOUNTER (EMERGENCY)
Facility: MEDICAL CENTER | Age: 63
End: 2018-02-04
Attending: EMERGENCY MEDICINE
Payer: COMMERCIAL

## 2018-02-04 VITALS
OXYGEN SATURATION: 94 % | SYSTOLIC BLOOD PRESSURE: 180 MMHG | RESPIRATION RATE: 17 BRPM | HEIGHT: 69 IN | WEIGHT: 315 LBS | DIASTOLIC BLOOD PRESSURE: 90 MMHG | HEART RATE: 49 BPM | TEMPERATURE: 97.4 F | BODY MASS INDEX: 46.65 KG/M2

## 2018-02-04 DIAGNOSIS — K62.5 RECTAL BLEEDING: ICD-10-CM

## 2018-02-04 PROCEDURE — 99283 EMERGENCY DEPT VISIT LOW MDM: CPT

## 2018-02-04 PROCEDURE — 82272 OCCULT BLD FECES 1-3 TESTS: CPT

## 2018-02-04 NOTE — ED NOTES
"Pt ambulatory to RD10, to BR before heading to room. Reports no blood in stool, states he does not believe it is rectal but that \"it's like a cut, it's dripping.\"   "

## 2018-02-04 NOTE — ED NOTES
Pt given discharge instructions, reviewed, had no questions for RN. Verbalizes understanding of follow up. VS stable. Ambulatory to ED exit, steady on feet.

## 2018-02-04 NOTE — ED PROVIDER NOTES
ED Provider Note    ED Provider Note      Primary care provider: Ema Lucero D.O.    CHIEF COMPLAINT  Chief Complaint   Patient presents with   • Rectal Bleeding     takes warfarin for vascular disease, noticed blood soaked underwear when he went to beed.        HPI  Tobias Morin is a 62 y.o. male who presents to the Emergency Department with chief complaint of bleeding from perineum. Patient stated that he was in bed he felt an abnormal moist sensation in his groin. Went to the bathroom in his underwear were soaked with blood. Patient stated he placed dry clean washcloth in the area and cleaning blood away and had recurrence of some dripping of the blood from the area. Stated he felt as though was coming from directly behind his scrotum. He's had no hematuria no melena no hematochezia. Does have a history of hemorrhoids but states that the blood was not coming from his rectum. Upon my evaluation patient reports the bleeding has completely stopped. He had bowel movement just prior to my evaluation he states no blood within the stool no dark tarry stool. Patient is on chronic Coumadin therapy for pulmonary embolisms does have a history of previous GI bleed and states he has not been taking his Protonix regularly. Patient's currently in no pain he's had no palpitations no chest pain or shortness of breath no dizziness altered mental status or lightheadedness. He's been otherwise well recently with no other acute complaints. INR checked a few days ago was reportedly 2.7    REVIEW OF SYSTEMS  10 systems reviewed and otherwise negative, pertinent positives and negatives listed in the history of present illness.    PAST MEDICAL HISTORY   has a past medical history of Backpain; BPH (benign prostatic hyperplasia); COPD (chronic obstructive pulmonary disease); Dental disorder; Diabetes; Fall; Heart burn; Hiatus hernia syndrome; Hypertension; Indigestion; Obesity; Personal history of venous thrombosis and embolism;  "Psychiatric problem; Pulmonary embolus (12/2011, 9/2012); Renal disorder; Sleep apnea; Snoring; Unspecified hemorrhagic conditions; and Unspecified urinary incontinence.    SURGICAL HISTORY   has a past surgical history that includes appendectomy (as child); gastroscopy-endo (1/3/2012); gastroscopy-endo (1/5/2012); gastroscopy with biopsy (3/16/2012); colonoscopy with biopsy (3/16/2012); shoulder arthroscopy w/ rotator cuff repair (8/26/2013); shoulder decompression arthroscopic (8/26/2013); and other abdominal surgery.    SOCIAL HISTORY  Social History   Substance Use Topics   • Smoking status: Former Smoker     Packs/day: 1.50     Years: 30.00     Quit date: 12/21/2001   • Smokeless tobacco: Not on file   • Alcohol use Yes      Comment: occassional      History   Drug Use No       FAMILY HISTORY  Non-Contributory    CURRENT MEDICATIONS  Coumadin    ALLERGIES  Allergies   Allergen Reactions   • Levofloxacin Rash and Itching     rash       PHYSICAL EXAM  VITAL SIGNS: BP (!) 180/90   Pulse 60   Temp 36.3 °C (97.4 °F)   Resp 18   Ht 1.753 m (5' 9\")   Wt (!) 155.3 kg (342 lb 6 oz)   SpO2 96%   BMI 50.56 kg/m²   Pulse ox interpretation: I interpret this pulse ox as normal.  Constitutional: Alert and oriented x 3, no acute Distress  HEENT: Atraumatic normocephalic, pupils are equal round reactive to light extraocular movements are intact. The nares is clear, external ears are normal, mouth shows moist mucous membranes  Neck: Supple, no JVD no tracheal deviation  Cardiovascular: Regular rate and rhythm no murmur rub or gallop 2+ pulses peripherally x4  Thorax & Lungs: No respiratory distress, no wheezes rales or rhonchi, No chest tenderness.   GI: Soft nontender nondistended positive bowel sounds, no peritoneal signs  : Normal-appearing external genitalia uncircumcised no blood at urethral meatus. There is one 2 spots of dried blood just posterior to the scrotum and the perennial. There is no identifiable active " bleeding there is no identifiable skin breakdown or laceration.  Rectal: Normal rectal tone very faintly Hemoccult positive no tobin blood  Skin: Warm dry no acute rash or lesion  Musculoskeletal: Moving all extremities with full range and 5 of 5 strength, no acute  deformity  Neurologic: Cranial nerves III through XII are grossly intact, no sensory deficit, no cerebellar dysfunction   Psychiatric: Appropriate affect for situation at this time        COURSE & MEDICAL DECISION MAKING  Pertinent Labs & Imaging studies reviewed. (See chart for details)    2:01 AM - Patient seen and examined at bedside.         Patient noted to have slightly elevated blood pressure likely circumstantial secondary to presenting complaint. Referred to primary care physician for further evaluation.    Medical Decision Making: Upon my evaluation the patient's bleeding has completely stopped. Patient is very sure that it was not coming from his rectum or from his penis as he checked to be very thoroughly. I see no evidence on physical exam that was rectal or urethral. He is very faintly Hemoccult positive however states has not been taking his Protonix regularly. had no abdominal pain no palpitations no hypertension no lightheadedness no other sign of acute large volume blood loss. I discussed with the patient with his very faintly Hemoccult positive that I would like to laboratory evaluation as well as INR however patient has no acute complaints and prefers not to do this now as his situation is completely resolved. I feel as though this is completely reasonable as he has no sign of threatening bleed at this time I instructed the patient to resume his Protonix regularly twice daily and to follow-up with the anti-coag clinic as well as his gastroenterologist. Patient understands to return immediately for any further bleeding any lightheadedness dizziness palpitations or any other acute symptoms or concerns otherwise discharged home in stable  "condition.    BP (!) 180/90   Pulse (!) 49   Temp 36.3 °C (97.4 °F)   Resp 17   Ht 1.753 m (5' 9\")   Wt (!) 155.3 kg (342 lb 6 oz)   SpO2 94%   BMI 50.56 kg/m²     FINAL IMPRESSION  1. Perennial bleeding, resolved      This dictation has been created using voice recognition software and/or scribes. The accuracy of the dictation is limited by the abilities of the software and the expertise of the scribes. I expect there may be some errors of grammar and possibly content. I made every attempt to manually correct the errors within my dictation. However, errors related to voice recognition software and/or scribes may still exist and should be interpreted within the appropriate context.            "

## 2018-02-05 ENCOUNTER — PATIENT OUTREACH (OUTPATIENT)
Dept: HEALTH INFORMATION MANAGEMENT | Facility: OTHER | Age: 63
End: 2018-02-05

## 2018-02-05 NOTE — PROGRESS NOTES
Placed discharge outreach phone call to patient s/p ER discharge 2/4/18.  Left voicemail providing my contact information and instructions to call with any questions or concerns.

## 2018-02-06 ENCOUNTER — OFFICE VISIT (OUTPATIENT)
Dept: URGENT CARE | Facility: PHYSICIAN GROUP | Age: 63
End: 2018-02-06
Payer: COMMERCIAL

## 2018-02-06 ENCOUNTER — ANTICOAGULATION MONITORING (OUTPATIENT)
Dept: VASCULAR LAB | Facility: MEDICAL CENTER | Age: 63
End: 2018-02-06

## 2018-02-06 ENCOUNTER — TELEPHONE (OUTPATIENT)
Dept: URGENT CARE | Facility: PHYSICIAN GROUP | Age: 63
End: 2018-02-06

## 2018-02-06 ENCOUNTER — HOSPITAL ENCOUNTER (OUTPATIENT)
Dept: LAB | Facility: MEDICAL CENTER | Age: 63
End: 2018-02-06
Attending: EMERGENCY MEDICINE
Payer: COMMERCIAL

## 2018-02-06 VITALS
WEIGHT: 315 LBS | HEART RATE: 80 BPM | RESPIRATION RATE: 20 BRPM | DIASTOLIC BLOOD PRESSURE: 68 MMHG | SYSTOLIC BLOOD PRESSURE: 120 MMHG | HEIGHT: 69 IN | BODY MASS INDEX: 46.65 KG/M2 | TEMPERATURE: 98.2 F | OXYGEN SATURATION: 94 %

## 2018-02-06 DIAGNOSIS — I26.99 OTHER PULMONARY EMBOLISM WITHOUT ACUTE COR PULMONALE, UNSPECIFIED CHRONICITY (HCC): ICD-10-CM

## 2018-02-06 DIAGNOSIS — N50.1 SCROTAL BLEEDING: ICD-10-CM

## 2018-02-06 DIAGNOSIS — Z79.01 LONG TERM CURRENT USE OF ANTICOAGULANT THERAPY: ICD-10-CM

## 2018-02-06 LAB
INR PPP: 1.9 (ref 0.87–1.13)
PROTHROMBIN TIME: 21.5 SEC (ref 12–14.6)

## 2018-02-06 PROCEDURE — 99203 OFFICE O/P NEW LOW 30 MIN: CPT | Performed by: EMERGENCY MEDICINE

## 2018-02-06 PROCEDURE — 36415 COLL VENOUS BLD VENIPUNCTURE: CPT

## 2018-02-06 PROCEDURE — 85610 PROTHROMBIN TIME: CPT

## 2018-02-06 ASSESSMENT — ENCOUNTER SYMPTOMS
COUGH: 0
CHILLS: 0
BLOOD IN STOOL: 0
FEVER: 0
BRUISES/BLEEDS EASILY: 1
VOMITING: 0
SENSORY CHANGE: 0
EYE DISCHARGE: 0
EYE REDNESS: 0
ABDOMINAL PAIN: 0
NAUSEA: 0
PALPITATIONS: 0
HEMOPTYSIS: 0

## 2018-02-06 NOTE — PROGRESS NOTES
Spoke with pt on the phone. He report bleeding from his scrotum x 2. Went to ER on 2-4-18, went to urgent care today when it happened again. Bleeding has resolved. Pt got INR drawn but no result yet. PE in 2014. Recommended pt hold warfarin tonight then resume usual regimen. Will await results from INR draw today.     Maci Pathak, Pharm D

## 2018-02-06 NOTE — PROGRESS NOTES
Subjective:      Tobias Morin is a 62 y.o. male who presents with Bleeding/Bruising (bleeding from testicle, no known injury x2 days ago)            HPI  Pt bleeding from scrotum, seen in ED no bleeding found, rebled this AM. Not bleeding currently 1/29 INR 2.3 PMH of PTE  X 2 patient was frustrated and that the emergency department staff could not find bleeding 3 days ago although there was no bleeding at the time he was seen.    Allergies   Allergen Reactions   • Levofloxacin Rash and Itching     rash     Social History     Social History   • Marital status:      Spouse name: N/A   • Number of children: N/A   • Years of education: N/A     Occupational History   • Not on file.     Social History Main Topics   • Smoking status: Former Smoker     Packs/day: 1.50     Years: 30.00     Quit date: 12/21/2001   • Smokeless tobacco: Never Used   • Alcohol use Yes      Comment: occassional   • Drug use: No   • Sexual activity: Not on file     Other Topics Concern   • Not on file     Social History Narrative   • No narrative on file     Past Medical History:   Diagnosis Date   • Backpain    • BPH (benign prostatic hyperplasia)    • COPD (chronic obstructive pulmonary disease) (CMS-HCC)    • Dental disorder     partial upper and lower   • Diabetes     oral agents   • Fall    • Heart burn    • Hiatus hernia syndrome    • Hypertension    • Indigestion    • Obesity    • Personal history of venous thrombosis and embolism    • Psychiatric problem     depression   • Pulmonary embolus (CMS-HCC) 12/2011, 9/2012    bilateral   • Renal disorder    • Sleep apnea     Uses CPAP   • Snoring    • Unspecified hemorrhagic conditions    • Unspecified urinary incontinence      Current Outpatient Prescriptions on File Prior to Visit   Medication Sig Dispense Refill   • warfarin (COUMADIN) 3 MG Tab TAKE 1 TO 1 & 1/2 TABLETS BY MOUTH DAILY AS DIRECTED BY  [facility] 135 Tab 1   • atorvastatin (LIPITOR) 10 MG Tab Take  "10 mg by mouth every evening.     • sertraline (ZOLOFT) 50 MG Tab Take 50 mg by mouth every bedtime.     • tamsulosin (FLOMAX) 0.4 MG capsule Take 0.4 mg by mouth every bedtime. Indications: Enlarged Prostate with Urination Problems     • fenofibrate (TRIGLIDE) 160 MG tablet Take 160 mg by mouth every bedtime.     • metoprolol (LOPRESSOR) 25 MG TABS Take 25 mg by mouth every evening.     • pantoprazole (PROTONIX) 40 MG TBEC Take 40 mg by mouth 2 times a day.     • lisinopril (PRINIVIL) 10 MG TABS Take 10 mg by mouth every bedtime. Indications: High Blood Pressure       No current facility-administered medications on file prior to visit.      Family History   Problem Relation Age of Onset   • Diabetes Mother      Review of Systems   Constitutional: Negative for chills and fever.   HENT: Negative for congestion and nosebleeds.    Eyes: Negative for discharge and redness.   Respiratory: Negative for cough and hemoptysis.    Cardiovascular: Negative for chest pain and palpitations.   Gastrointestinal: Negative for abdominal pain, blood in stool, melena, nausea and vomiting.   Genitourinary: Negative for hematuria.        Patient denies any bleeding in his urine. States that his bleeding is occurring from the scrotum when it occurs.   Skin:        Patient has an abrasion on his left forearm secondary to incidental trauma.   Neurological: Negative for sensory change.   Endo/Heme/Allergies: Negative for environmental allergies. Bruises/bleeds easily.          Objective:     /68   Pulse 80   Temp 36.8 °C (98.2 °F)   Resp 20   Ht 1.753 m (5' 9\")   Wt (!) 151 kg (333 lb)   SpO2 94%   BMI 49.18 kg/m²      Physical Exam   Constitutional: He appears well-developed and well-nourished. No distress.   HENT:   Head: Normocephalic and atraumatic.   Eyes: Conjunctivae are normal. Right eye exhibits discharge. Left eye exhibits no discharge.   Neck: Normal range of motion.   Cardiovascular: Normal rate.    Pulmonary/Chest: " Effort normal.   Abdominal: He exhibits distension.   4+ distended nontender.   Genitourinary: Penis normal. No penile tenderness.   Genitourinary Comments: No bleeding from his glans penis shaft's penis or scrotum.   Neurological: He is alert.   Skin: Skin is warm and dry. He is not diaphoretic.   Examine scrotum twice found no evidence of any clots or active bleeding.   Psychiatric: He has a normal mood and affect. His behavior is normal.   Vitals reviewed.              Assessment/Plan:      DX: Recurrent bleeding from scrotum not confirmed     I will call with the INR, I will call the patient and determine whether he needs to contact the [facility]mad[facility] clinic.

## 2018-02-10 ENCOUNTER — HOSPITAL ENCOUNTER (OUTPATIENT)
Dept: LAB | Facility: MEDICAL CENTER | Age: 63
End: 2018-02-10
Attending: FAMILY MEDICINE
Payer: COMMERCIAL

## 2018-02-10 LAB
ALBUMIN SERPL BCP-MCNC: 3.9 G/DL (ref 3.2–4.9)
ALBUMIN/GLOB SERPL: 1.4 G/DL
ALP SERPL-CCNC: 31 U/L (ref 30–99)
ALT SERPL-CCNC: 18 U/L (ref 2–50)
ANION GAP SERPL CALC-SCNC: 8 MMOL/L (ref 0–11.9)
AST SERPL-CCNC: 18 U/L (ref 12–45)
BILIRUB SERPL-MCNC: 0.9 MG/DL (ref 0.1–1.5)
BUN SERPL-MCNC: 18 MG/DL (ref 8–22)
CALCIUM SERPL-MCNC: 9.5 MG/DL (ref 8.5–10.5)
CHLORIDE SERPL-SCNC: 104 MMOL/L (ref 96–112)
CHOLEST SERPL-MCNC: 117 MG/DL (ref 100–199)
CO2 SERPL-SCNC: 25 MMOL/L (ref 20–33)
CREAT SERPL-MCNC: 1.34 MG/DL (ref 0.5–1.4)
EST. AVERAGE GLUCOSE BLD GHB EST-MCNC: 134 MG/DL
GLOBULIN SER CALC-MCNC: 2.8 G/DL (ref 1.9–3.5)
GLUCOSE SERPL-MCNC: 102 MG/DL (ref 65–99)
HBA1C MFR BLD: 6.3 % (ref 0–5.6)
HDLC SERPL-MCNC: 40 MG/DL
LDLC SERPL CALC-MCNC: 62 MG/DL
POTASSIUM SERPL-SCNC: 4 MMOL/L (ref 3.6–5.5)
PROT SERPL-MCNC: 6.7 G/DL (ref 6–8.2)
SODIUM SERPL-SCNC: 137 MMOL/L (ref 135–145)
TRIGL SERPL-MCNC: 77 MG/DL (ref 0–149)

## 2018-02-10 PROCEDURE — 83036 HEMOGLOBIN GLYCOSYLATED A1C: CPT

## 2018-02-10 PROCEDURE — 80061 LIPID PANEL: CPT

## 2018-02-10 PROCEDURE — 80053 COMPREHEN METABOLIC PANEL: CPT

## 2018-02-10 PROCEDURE — 36415 COLL VENOUS BLD VENIPUNCTURE: CPT

## 2018-02-28 ENCOUNTER — ANTICOAGULATION MONITORING (OUTPATIENT)
Dept: VASCULAR LAB | Facility: MEDICAL CENTER | Age: 63
End: 2018-02-28

## 2018-02-28 DIAGNOSIS — Z79.01 LONG TERM CURRENT USE OF ANTICOAGULANT THERAPY: ICD-10-CM

## 2018-02-28 NOTE — PROGRESS NOTES
[facility]    Left a voicemail to remind pt to obtain next INR. Last INR was slightly subtherapuetic but was in the setting of bleeding.  Will follow up in 1-2 weeks.    El Ellis, BrooksD

## 2018-03-12 ENCOUNTER — ANTICOAGULATION VISIT (OUTPATIENT)
Dept: VASCULAR LAB | Facility: MEDICAL CENTER | Age: 63
End: 2018-03-12
Attending: INTERNAL MEDICINE
Payer: COMMERCIAL

## 2018-03-12 DIAGNOSIS — Z86.711 HISTORY OF PULMONARY EMBOLISM: ICD-10-CM

## 2018-03-12 DIAGNOSIS — Z79.01 LONG TERM CURRENT USE OF ANTICOAGULANT THERAPY: ICD-10-CM

## 2018-03-12 LAB
INR BLD: 2.7 (ref 0.9–1.2)
INR PPP: 2.7 (ref 2–3.5)

## 2018-03-12 PROCEDURE — 99211 OFF/OP EST MAY X REQ PHY/QHP: CPT

## 2018-03-12 PROCEDURE — 85610 PROTHROMBIN TIME: CPT

## 2018-03-12 RX ORDER — WARFARIN SODIUM 3 MG/1
TABLET ORAL
Qty: 135 TAB | Refills: 1 | Status: SHIPPED | OUTPATIENT
Start: 2018-03-12 | End: 2018-09-28 | Stop reason: SDUPTHER

## 2018-03-12 NOTE — PROGRESS NOTES
Anticoagulation Summary  As of 3/12/2018    INR goal:   2.0-3.0   TTR:   75.9 % (2.6 y)   Today's INR:   2.7   Maintenance plan:   4.5 mg (3 mg x 1.5) on Mon, Fri; 3 mg (3 mg x 1) all other days   Weekly total:   24 mg   Plan last modified:   Penelope Rojo, PharmD (12/18/2017)   Next INR check:   4/23/2018   Target end date:   Indefinite    Indications    Pulmonary embolism (CMS-HCC) [I26.99]  Long term (current) use of anticoagulants [Z79.01] [Z79.01]             Anticoagulation Episode Summary     [facility] check location:   [facility]    Preferred lab:       Send INR reminders to:       Comments:   see referral under media from ellen lucero DO       Anticoagulation Care Providers     Provider Role Specialty Phone number    Ellen Lucero D.O. Referring Family Medicine [phone]    [facility] Responsible  [phone]        Anticoagulation Patient Findings  Patient Findings     Positives:   Hospital admission    Negatives:   Signs/symptoms of thrombosis, Signs/symptoms of bleeding, Laboratory test error suspected, Change in health, Change in alcohol use, Change in activity, Upcoming invasive procedure, Emergency department visit, Upcoming dental procedure, Missed doses, Extra doses, Change in medications, Change in diet/appetite, Bruising, Other complaints        HPI:   Seen in clinic today, on anticoagulation therapy with warfarin for stroke prevention due to history of pulmonary embolism.    Previous INR  1.9 on 2/6/18     Does patient have any changes to current medical/health status since last appt (Y/N):  YES; recent hospital and urgent care visit due to scrotal bleeding. INR during visit was slightly subtherapeutic. A dose of warfarin was held. Reason for bleed and source of bleed was identified. Has since cleared and no further bleeding has been noted.  Does patient have any signs/symptoms of bleeding and/or thrombosis since the last appt (Y/N):  NO  Does patient have any  interval changes to diet or medications since last appt (Y/N):  NO  Are there any complications or cost restrictions with current therapy (Y/N):  NO      Vitals:  Patient declines BP/vitals check today      Asssessment:       INR therapeutic     Plan:  Instructed patient to continue with current warfarin regimen.      Not indicate for dose change since no more signs of bleeding and INR therapeutic.     Follow up:  Because warfarin is a high risk medication and current CHEST guidelines recommend regular monitoring intervals (few days up to 12 weeks), will have patient return to clinic in 6 weeks to recheck INR.    Penelope Rojo, PharmD

## 2018-03-28 ENCOUNTER — HOSPITAL ENCOUNTER (OUTPATIENT)
Dept: LAB | Facility: MEDICAL CENTER | Age: 63
End: 2018-03-28
Attending: INTERNAL MEDICINE
Payer: COMMERCIAL

## 2018-03-28 ENCOUNTER — OFFICE VISIT (OUTPATIENT)
Dept: MEDICAL GROUP | Facility: PHYSICIAN GROUP | Age: 63
End: 2018-03-28
Payer: COMMERCIAL

## 2018-03-28 VITALS
DIASTOLIC BLOOD PRESSURE: 66 MMHG | RESPIRATION RATE: 16 BRPM | TEMPERATURE: 97.9 F | WEIGHT: 315 LBS | OXYGEN SATURATION: 96 % | HEIGHT: 69 IN | HEART RATE: 66 BPM | SYSTOLIC BLOOD PRESSURE: 126 MMHG | BODY MASS INDEX: 46.65 KG/M2

## 2018-03-28 DIAGNOSIS — E78.5 HYPERLIPIDEMIA, UNSPECIFIED HYPERLIPIDEMIA TYPE: ICD-10-CM

## 2018-03-28 DIAGNOSIS — E66.9 DIABETES MELLITUS TYPE 2 IN OBESE (HCC): ICD-10-CM

## 2018-03-28 DIAGNOSIS — K28.4 BLEEDING ULCER: ICD-10-CM

## 2018-03-28 DIAGNOSIS — Z23 NEED FOR TDAP VACCINATION: ICD-10-CM

## 2018-03-28 DIAGNOSIS — I10 ESSENTIAL HYPERTENSION: ICD-10-CM

## 2018-03-28 DIAGNOSIS — F41.9 ANXIETY: ICD-10-CM

## 2018-03-28 DIAGNOSIS — E11.69 DIABETES MELLITUS TYPE 2 IN OBESE (HCC): ICD-10-CM

## 2018-03-28 DIAGNOSIS — M19.90 ARTHRITIS: ICD-10-CM

## 2018-03-28 DIAGNOSIS — E66.01 MORBID OBESITY WITH BMI OF 45.0-49.9, ADULT (HCC): ICD-10-CM

## 2018-03-28 DIAGNOSIS — N18.30 STAGE 3 CHRONIC KIDNEY DISEASE (HCC): ICD-10-CM

## 2018-03-28 PROBLEM — K21.9 GERD (GASTROESOPHAGEAL REFLUX DISEASE): Status: ACTIVE | Noted: 2018-03-28

## 2018-03-28 PROBLEM — N18.9 CHRONIC KIDNEY DISEASE: Status: ACTIVE | Noted: 2018-03-28

## 2018-03-28 LAB
CREAT UR-MCNC: 145.9 MG/DL
MICROALBUMIN UR-MCNC: <0.7 MG/DL
MICROALBUMIN/CREAT UR: NORMAL MG/G (ref 0–30)

## 2018-03-28 PROCEDURE — 82043 UR ALBUMIN QUANTITATIVE: CPT

## 2018-03-28 PROCEDURE — 90715 TDAP VACCINE 7 YRS/> IM: CPT | Performed by: INTERNAL MEDICINE

## 2018-03-28 PROCEDURE — 99204 OFFICE O/P NEW MOD 45 MIN: CPT | Mod: 25 | Performed by: INTERNAL MEDICINE

## 2018-03-28 PROCEDURE — 90471 IMMUNIZATION ADMIN: CPT | Performed by: INTERNAL MEDICINE

## 2018-03-28 PROCEDURE — 82570 ASSAY OF URINE CREATININE: CPT

## 2018-03-28 RX ORDER — FENOFIBRATE 160 MG/1
160 TABLET ORAL
Qty: 90 TAB | Refills: 1 | Status: SHIPPED | OUTPATIENT
Start: 2018-03-28 | End: 2018-09-28 | Stop reason: SDUPTHER

## 2018-03-28 RX ORDER — ATORVASTATIN CALCIUM 10 MG/1
10 TABLET, FILM COATED ORAL DAILY
Qty: 90 TAB | Refills: 1 | Status: SHIPPED | OUTPATIENT
Start: 2018-03-28 | End: 2018-07-10 | Stop reason: SDUPTHER

## 2018-03-28 RX ORDER — PANTOPRAZOLE SODIUM 40 MG/1
40 TABLET, DELAYED RELEASE ORAL 2 TIMES DAILY
Qty: 180 TAB | Refills: 1 | Status: SHIPPED | OUTPATIENT
Start: 2018-03-28 | End: 2018-11-05 | Stop reason: SDUPTHER

## 2018-03-28 RX ORDER — LISINOPRIL 10 MG/1
10 TABLET ORAL
Qty: 90 TAB | Refills: 1 | Status: SHIPPED | OUTPATIENT
Start: 2018-03-28 | End: 2018-07-10 | Stop reason: SDUPTHER

## 2018-03-28 ASSESSMENT — PAIN SCALES - GENERAL: PAINLEVEL: NO PAIN

## 2018-03-28 ASSESSMENT — PATIENT HEALTH QUESTIONNAIRE - PHQ9: CLINICAL INTERPRETATION OF PHQ2 SCORE: 0

## 2018-03-28 NOTE — LETTER
[facility]  Obinna Stroud M.D.  [address]  [facility] [address]  Fax: [phone]   Authorization for Release/Disclosure of   Protected Health Information   Name: TOBIAS SHER : 1955 SSN: [id number]   Address: [address] [place] [address] [facility] [address] Phone:    [phone] (home) [phone] (work)   I authorize the entity listed below to release/disclose the PHI below to:   [facility]/Obinna Stroud M.D. and Obinna Stroud M.D.   Provider or Entity Name:     Address   City, State, Zip   Phone:      Fax:     Reason for request: continuity of care   Information to be released:    [  ] LAST COLONOSCOPY,  including any PATH REPORT and follow-up  [  ] LAST FIT/COLOGUARD RESULT [  ] LAST DEXA  [  ] LAST MAMMOGRAM  [  ] LAST PAP  [  ] LAST LABS [ x ] RETINA EXAM REPORT  [  ] IMMUNIZATION RECORDS  [  ] Release all info      [  ] Check here and initial the line next to each item to release ALL health information INCLUDING  _____ Care and treatment for drug and / or alcohol abuse  _____ HIV testing, infection status, or AIDS  _____ Genetic Testing    DATES OF SERVICE OR TIME PERIOD TO BE DISCLOSED: _____________  I understand and acknowledge that:  * This Authorization may be revoked at any time by you in writing, except if your health information has already been used or disclosed.  * Your health information that will be used or disclosed as a result of you signing this authorization could be re-disclosed by the recipient. If this occurs, your re-disclosed health information may no longer be protected by State or Federal laws.  * You may refuse to sign this Authorization. Your refusal will not affect your ability to obtain treatment.  * This Authorization becomes effective upon signing and will  on (date) __________.      If no date is indicated, this Authorization will  one (1) year from the signature date.    Name: Tobias Sher     Date:     3/28/2018       PLEASE FAX REQUESTED RECORDS BACK  TO: [phone]

## 2018-03-28 NOTE — ASSESSMENT & PLAN NOTE
Symptoms well controlled (nocturia resolved) on flomax daily. Following with urology PA annually.

## 2018-03-28 NOTE — ASSESSMENT & PLAN NOTE
Diet controlled. Stopped medications 3-4 years ago when he improved his diet. His wife passed away October 2017 and since then he's been eating better; his wife used to order out more since she was sick. His fasting sugars are  in the past week. However, the sugar of 125 wasn't actually fasting. His fasting sugar this morning was 95. Had eye exam recently with Dr. Mccarthy in ophthalmology and was told his eyes were good.

## 2018-03-28 NOTE — PROGRESS NOTES
[facility]IMARY [facility]ARE CLINIC NEW PATIENT H&P  Chief Complaint   Patient presents with   • Diabetes     History of Present Illness     Pulmonary embolism (CMS-[facility])  Anti-coagulated on coumadin.     Diabetes mellitus type 2 in obese  Diet controlled. Stopped medications 3-4 years ago when he improved his diet. His wife passed away October 2017 and since then he's been eating better; his wife used to order out more since she was sick. His fasting sugars are  in the past week. However, the sugar of 125 wasn't actually fasting. His fasting sugar this morning was 95. Had eye exam recently with Dr. Mccarthy in ophthalmology and was told his eyes were good.     BPH (benign prostatic hyperplasia)  Symptoms well controlled (nocturia resolved) on flomax daily. Following with urology PA annually.     Chronic kidney disease  Following with nephrology, Dr. Parker but hasn't seen him for some time.     Anxiety  Symptoms well controlled on zoloft 50 mg daily.     Hyperlipidemia  Taking atorvastatin 10 mg daily and fenofibrate 160 mg daily.     Bleeding ulcer  Had issues with blood loss due to bleeding ulcer and has been on protonix bid indefinitely.     Morbid obesity with BMI of 45.0-49.9, adult ([facility])  He thinks he has lost 90 lbs since October 2017 when his wife passed.     Arthritis  Receives cortisone injections from Dr. Baker at [facility] in his right knee.     Current Outpatient Prescriptions   Medication Sig Dispense Refill   • lisinopril (PRINIVIL) 10 MG Tab Take 1 Tab by mouth every bedtime. 90 Tab 1   • pantoprazole (PROTONIX) 40 MG Tablet Delayed Response Take 1 Tab by mouth 2 times a day. 180 Tab 1   • metoprolol (LOPRESSOR) 25 MG Tab Take 1 Tab by mouth every evening. 90 Tab 1   • fenofibrate (TRIGLIDE) 160 MG tablet Take 1 Tab by mouth every bedtime. 90 Tab 1   • sertraline (ZOLOFT) 50 MG Tab Take 1 Tab by mouth every bedtime. 90 Tab 1   • atorvastatin (LIPITOR) 10 MG Tab Take 1 Tab by mouth every day. 90 Tab 1   • warfarin  (COUMADIN) 3 MG Tab TAKE 1 TO 1 & 1/2 TABLETS BY MOUTH DAILY AS DIRECTED BY  [facility]TICO[facility] 135 Tab 1   • tamsulosin (FLOMAX) 0.4 MG capsule Take 0.4 mg by mouth every bedtime. Indications: Enlarged Prostate with Urination Problems       No current facility-administered medications for this visit.      Past Medical History:   Diagnosis Date   • Arthritis 3/28/2018   • Bleeding ulcer 9/15/2012   • BPH (benign prostatic hyperplasia)    • Dental disorder     partial upper and lower   • Diabetes     oral agents   • Diabetes mellitus type 2 in obese (CMS-HCC) 12/24/2011   • Heart burn    • Hiatus hernia syndrome    • Hyperlipidemia 3/28/2018   • Hypertension    • Morbid obesity with BMI of 45.0-49.9, adult ([facility]) 3/28/2018   • Obesity    • COREY on CPAP 12/24/2011   • Personal history of venous thrombosis and embolism    • Pulmonary embolus (CMS-HCC) 12/2011, 9/2012    bilateral   • Sleep apnea     Uses CPAP     Past Surgical History:   Procedure Laterality Date   • SHOULDER ARTHROSCOPY W/ ROTATOR CUFF REPAIR  8/26/2013    Performed by Tae Sommers M.D. at SURGERY [facility]   • SHOULDER DECOMPRESSION ARTHROSCOPIC  8/26/2013    Performed by Tae Sommers M.D. at SURGERY [facility]   • GASTROSCOPY WITH BIOPSY  3/16/2012    Performed by KEVIN QUINTANILLA at SURGERY [facility]   • COLONOSCOPY WITH BIOPSY  3/16/2012    Performed by KEVIN QUINTANILLA at SURGERY [facility]   • GASTROSCOPY-ENDO  1/5/2012    Performed by KEVIN QUINTANILLA at ENDOSCOPY [facility]   • GASTROSCOPY-ENDO  1/3/2012    Performed by KEVIN QUINTANILLA at ENDOSCOPY [facility]   • PB APPENDECTOMY  as child     Social History   Substance Use Topics   • Smoking status: Former Smoker     Packs/day: 1.50     Years: 30.00     Quit date: 12/21/2001   • Smokeless tobacco: Never Used   • Alcohol use Yes      Comment: occassional     Social History     Social History Narrative     "Determined eligibility for state programs for [facility]     Family History   Problem Relation Age of Onset   • Diabetes Mother    • Dementia Father      Family Status   Relation Status   • Mother Alive, age 93y   • Father    • Sister Alive   • Sister Alive   • Sister Alive     Allergies: Levofloxacin    ROS  Constitutional: Negative for fatigue/generalized weakness.   HEENT: Negative for  vision changes, hearing changes    Respiratory: Negative for shortness of breath  Cardiovascular: Negative for chest pain, palpitations  Gastrointestinal: Negative for blood in stool, constipation, diarrhea  Genitourinary: Negative for dysuria, polyuria  Musculoskeletal: Negative for myalgias. Positive for bilateral knee and back pain  Skin: Negative for rash  Neurological: Negative for numbness, tingling  Psychiatric/Behavioral: Positive for anxiety as per HPI     Objective   Blood pressure 126/66, pulse 66, temperature 36.6 °C (97.9 °F), resp. rate 16, height 1.753 m (5' 9\"), weight (!) 147.9 kg (326 lb), SpO2 96 %. Body mass index is 48.14 kg/m².    General: Alert, oriented. In no acute distress   HEET: EOMI, PERRL, conjunctiva non-injected, sclera non-icteric.  Nares patent with no significant congestion or drainage.  Marisa pinnae, external auditory canals, TM pearly gray with normal light reflex bilaterally.Oral mucous membranes pink and moist with no lesions.  Neck: supple with no cervical, subclavicular lymphadenopathy, JVD, palpable thyroid nodules   Lungs: clear to auscultation bilaterally with good excursion.  CV: regular rate and rhythm.  Abdomen soft, non-distended, non-tender with normal bowel sounds. No hepatosplenomegaly, no masses palpated  Skin: no lesions. Warm, dry   Psychiatric: appropriate mood and affect     Assessment and Plan   The following treatment plan was discussed     1. Diabetes mellitus type 2 in obese (CMS-HCC)  Presents for follow up of diabetes mellitus that is diet controlled. "     A1c:   Lab Results   Component Value Date/Time    HBA1C 6.3 (H) 02/10/2018 07:53 AM      Glucose monitoring   Fasting sugars: 80s-90s  Diabetic complications: nephropathy  ACR Albumin/Creatinine Ratio goal <30, due for check (ordered)  HTN: Blood pressure goal <130/<80. Currently Rx ACE/ARB: Yes  Hyperlipidemia: Cholesterol goal LDL <100, total/HDL <5. Currently Rx Statin: Yes  Last eye exam: will request records from Dr. Mccarthy    Last monofilament foot exam: today     - MICROALBUMIN CREAT RATIO URINE; Future  - Diabetic Monofilament LE Exam    2. Morbid obesity with BMI of 45.0-49.9, adult (CMS-HCC)  Has been eating better since his wife passed 10/2017 and thinks he has lost 90 lbs since then.   - Patient identified as having weight management issue.  Appropriate orders and counseling given.    3. Anxiety  Symptoms well controlled on sertraline 50 mg daily.   - sertraline (ZOLOFT) 50 MG Tab; Take 1 Tab by mouth every bedtime.  Dispense: 90 Tab; Refill: 1    4. Hyperlipidemia, unspecified hyperlipidemia type  Lipids well controlled on fenofibrate and atorvastatin.   - fenofibrate (TRIGLIDE) 160 MG tablet; Take 1 Tab by mouth every bedtime.  Dispense: 90 Tab; Refill: 1  - atorvastatin (LIPITOR) 10 MG Tab; Take 1 Tab by mouth every day.  Dispense: 90 Tab; Refill: 1    Lab Results   Component Value Date/Time    CHOLSTRLTOT 117 02/10/2018 07:53 AM    LDL 62 02/10/2018 07:53 AM    HDL 40 02/10/2018 07:53 AM    TRIGLYCERIDE 77 02/10/2018 07:53 AM       5. Bleeding ulcer  Had bleeding ulcer and was told by his gastroenterologist at that time to remain on protonix bid indefinitely.   - pantoprazole (PROTONIX) 40 MG Tablet Delayed Response; Take 1 Tab by mouth 2 times a day.  Dispense: 180 Tab; Refill: 1    6. Need for Tdap vaccination  Given today.   - TDAP VACCINE =>6YO IM    7. Arthritis  Continue to  on weight loss. Following with Dr. Baker at [facility] for right knee cortisone injections.     8. Essential  hypertension  Well controlled, blood pressure 126/66 today. Continue current regimen.   - lisinopril (PRINIVIL) 10 MG Tab; Take 1 Tab by mouth every bedtime.  Dispense: 90 Tab; Refill: 1  - metoprolol (LOPRESSOR) 25 MG Tab; Take 1 Tab by mouth every evening.  Dispense: 90 Tab; Refill: 1    9. Stage 3 chronic kidney disease  Stable, diabetes and hypertension under good control.       Return in about 6 months (around 9/28/2018).    Health Maintenance      Health Maintenance Due   Topic Date Due   • RETINAL SCREENING  10/16/1972   • URINE ACR / MICROALBUMIN  11/15/2015     Obinna Stroud MD  Internal Medicine  [facility]

## 2018-04-23 ENCOUNTER — ANTICOAGULATION VISIT (OUTPATIENT)
Dept: VASCULAR LAB | Facility: MEDICAL CENTER | Age: 63
End: 2018-04-23
Attending: INTERNAL MEDICINE
Payer: COMMERCIAL

## 2018-04-23 DIAGNOSIS — I26.99 OTHER PULMONARY EMBOLISM WITHOUT ACUTE COR PULMONALE, UNSPECIFIED CHRONICITY (HCC): ICD-10-CM

## 2018-04-23 DIAGNOSIS — Z79.01 LONG TERM CURRENT USE OF ANTICOAGULANT THERAPY: ICD-10-CM

## 2018-04-23 LAB — INR PPP: 2.5 (ref 2–3.5)

## 2018-04-23 PROCEDURE — 99211 OFF/OP EST MAY X REQ PHY/QHP: CPT

## 2018-04-23 PROCEDURE — 85610 PROTHROMBIN TIME: CPT

## 2018-04-23 NOTE — PROGRESS NOTES
Anticoagulation Summary  As of 4/23/2018    INR goal:   2.0-3.0   TTR:   76.9 % (2.7 y)   Today's INR:   2.5   Maintenance plan:   4.5 mg (3 mg x 1.5) on Mon, Fri; 3 mg (3 mg x 1) all other days   Weekly total:   24 mg   Plan last modified:   Penelope Rojo, PharmD (12/18/2017)   Next INR check:   6/11/2018   Target end date:   Indefinite    Indications    Pulmonary embolism (CMS-HCC) [I26.99]  Long term (current) use of anticoagulants [Z79.01] [Z79.01]             Anticoagulation Episode Summary     [facility]   [facility]    Preferred lab:       Send INR reminders to:       Comments:   see referral under media from ellen lucero DO       Anticoagulation Care Providers     Provider Role Specialty Phone number    Ellen Lucero D.O. Referring Family Medicine [phone]    [facility] Responsible  [phone]        Anticoagulation Patient Findings  Patient Findings     Negatives:   Signs/symptoms of thrombosis, Signs/symptoms of bleeding, Laboratory test error suspected, Change in health, Change in alcohol use, Change in activity, Upcoming invasive procedure, Emergency department visit, Upcoming dental procedure, Missed doses, Extra doses, Change in medications, Change in diet/appetite, Hospital admission, Bruising, Other complaints        HPI:   Seen in clinic today, on anticoagulation therapy with warfarin for stroke prevention due to history of pulmonary embolism.    Previous INR  2.7 on 3/12/18     Does patient have any changes to current medical/health status since last appt (Y/N):  NO  Does patient have any signs/symptoms of bleeding and/or thrombosis since the last appt (Y/N):  NO  Does patient have any interval changes to diet or medications since last appt (Y/N):  NO  Are there any complications or cost restrictions with current therapy (Y/N):  NO      Vitals:  Patient declines BP/vitals check today      Asssessment:       INR therapeutic     Plan:  Instructed  patient to continue with current warfarin regimen.       Follow up:  Because warfarin is a high risk medication and current CHEST guidelines recommend regular monitoring intervals (few days up to 12 weeks), will have patient return to clinic in 7 weeks to recheck INR.    Penelope Rojo, BrooksD

## 2018-04-26 LAB — INR BLD: 2.5 (ref 0.9–1.2)

## 2018-06-11 ENCOUNTER — ANTICOAGULATION VISIT (OUTPATIENT)
Dept: VASCULAR LAB | Facility: MEDICAL CENTER | Age: 63
End: 2018-06-11
Attending: INTERNAL MEDICINE
Payer: COMMERCIAL

## 2018-06-11 VITALS — SYSTOLIC BLOOD PRESSURE: 130 MMHG | DIASTOLIC BLOOD PRESSURE: 79 MMHG | HEART RATE: 56 BPM

## 2018-06-11 DIAGNOSIS — Z79.01 LONG TERM CURRENT USE OF ANTICOAGULANT THERAPY: ICD-10-CM

## 2018-06-11 DIAGNOSIS — I26.99 OTHER PULMONARY EMBOLISM WITHOUT ACUTE COR PULMONALE, UNSPECIFIED CHRONICITY (HCC): ICD-10-CM

## 2018-06-11 LAB
INR BLD: 2 (ref 0.9–1.2)
INR PPP: 2 (ref 2–3.5)

## 2018-06-11 PROCEDURE — 99211 OFF/OP EST MAY X REQ PHY/QHP: CPT | Performed by: NURSE PRACTITIONER

## 2018-06-11 PROCEDURE — 85610 PROTHROMBIN TIME: CPT

## 2018-06-11 NOTE — PROGRESS NOTES
Anticoagulation Summary  As of 6/11/2018    INR goal:   2.0-3.0   TTR:   77.9 % (2.9 y)   Today's INR:   2.0   Warfarin maintenance plan:   4.5 mg (3 mg x 1.5) on Mon, Fri; 3 mg (3 mg x 1) all other days   Weekly warfarin total:   24 mg   Plan last modified:   Penelope Rojo, PharmD (12/18/2017)   Next INR check:   8/6/2018   Target end date:   Indefinite    Indications    Pulmonary embolism (HCC) [I26.99]  Long term (current) use of anticoagulants [Z79.01] [Z79.01]             Anticoagulation Episode Summary     [facility]    Preferred lab:       Send INR reminders to:       Comments:   see referral under media from ellen lucero DO       Anticoagulation Care Providers     Provider Role Specialty Phone number    Ellen Lucero D.O. Referring Family Medicine [phone]    [facility] Responsible  [phone]        Anticoagulation Patient Findings      HPI:  Tobias Morin seen in clinic today for follow up on anticoagulation therapy in the presence of PE hx. Denies any changes to current medical/health status since last appointment. Denies any medication or diet changes. No current symptoms of bleeding or thrombosis reported.    A/P:   INR therapeutic. Continue current regimen. BP recorded in vitals.    Follow up appointment in 8 week(s).    Next Appointment: Monday, August 6, 2018 at 10:00 am.     Kylee OSHEA

## 2018-07-10 DIAGNOSIS — E78.5 HYPERLIPIDEMIA, UNSPECIFIED HYPERLIPIDEMIA TYPE: ICD-10-CM

## 2018-07-10 DIAGNOSIS — I10 ESSENTIAL HYPERTENSION: ICD-10-CM

## 2018-07-11 ENCOUNTER — TELEPHONE (OUTPATIENT)
Dept: MEDICAL GROUP | Facility: PHYSICIAN GROUP | Age: 63
End: 2018-07-11

## 2018-07-11 DIAGNOSIS — E78.5 HYPERLIPIDEMIA, UNSPECIFIED HYPERLIPIDEMIA TYPE: ICD-10-CM

## 2018-07-11 RX ORDER — ATORVASTATIN CALCIUM 10 MG/1
10 TABLET, FILM COATED ORAL DAILY
Qty: 90 TAB | Refills: 1 | Status: SHIPPED | OUTPATIENT
Start: 2018-07-11 | End: 2019-04-22 | Stop reason: SDUPTHER

## 2018-07-11 RX ORDER — TAMSULOSIN HYDROCHLORIDE 0.4 MG/1
0.4 CAPSULE ORAL
Qty: 90 CAP | Refills: 1 | Status: SHIPPED | OUTPATIENT
Start: 2018-07-11 | End: 2019-01-22 | Stop reason: SDUPTHER

## 2018-07-11 RX ORDER — LISINOPRIL 10 MG/1
10 TABLET ORAL
Qty: 90 TAB | Refills: 1 | Status: SHIPPED | OUTPATIENT
Start: 2018-07-11 | End: 2019-04-22 | Stop reason: SDUPTHER

## 2018-07-11 NOTE — TELEPHONE ENCOUNTER
Pt has had OV within the 12 month protocol and lipid panel is current. 6 month supply sent to pharmacy.   Lab Results   Component Value Date/Time    CHOLSTRLTOT 117 02/10/2018 07:53 AM    LDL 62 02/10/2018 07:53 AM    HDL 40 02/10/2018 07:53 AM    TRIGLYCERIDE 77 02/10/2018 07:53 AM       Lab Results   Component Value Date/Time    SODIUM 137 02/10/2018 07:53 AM    POTASSIUM 4.0 02/10/2018 07:53 AM    CHLORIDE 104 02/10/2018 07:53 AM    CO2 25 02/10/2018 07:53 AM    GLUCOSE 102 (H) 02/10/2018 07:53 AM    BUN 18 02/10/2018 07:53 AM    CREATININE 1.34 02/10/2018 07:53 AM     Lab Results   Component Value Date/Time    ALKPHOSPHAT 31 02/10/2018 07:53 AM    ASTSGOT 18 02/10/2018 07:53 AM    ALTSGPT 18 02/10/2018 07:53 AM    TBILIRUBIN 0.9 02/10/2018 07:53 AM

## 2018-07-11 NOTE — TELEPHONE ENCOUNTER
Pt met protocol?: Yes pt last ov 3/2018   BP Readings from Last 1 Encounters:   06/11/18 130/79       Lab Results  Component Value Date/Time   CHOLSTRLTOT 117 02/10/2018 0753   TRIGLYCERIDE 77 02/10/2018 0753   HDL 40 02/10/2018 0753   LDL 62 02/10/2018 0753       Prostatic Specific Antigen Tot   Date Value Ref Range Status   09/23/2017 0.63 0.00 - 4.00 ng/mL Final     Comment:     The [employer] PSA assay is a paramagnetic particle,  chemiluminescent immunoassay for the quantitative determination  of total prostate specific antigen (PSA) levels using the  [employer] Immunoassay System. Values obtained with different  methods cannot be used interchangeably for patient monitoring.

## 2018-07-11 NOTE — TELEPHONE ENCOUNTER
Patient would like to know if he can go without taking Lipitor and Metoprolol?  He states he has lost over 100 pounds.  He states his blood pressure has been good.  He is in [place] at this moment.  I advised that he would need an appointment.  Patient still wanted me to ask.  Please advise.  Thank you.

## 2018-07-13 NOTE — TELEPHONE ENCOUNTER
If he stops his lipitor then he should have his lipid profile checked in 4 months from stopping it. Would want to see him in the office before I comment on him stopping metoprolol

## 2018-08-06 ENCOUNTER — ANTICOAGULATION MONITORING (OUTPATIENT)
Dept: VASCULAR LAB | Facility: MEDICAL CENTER | Age: 63
End: 2018-08-06

## 2018-08-06 ENCOUNTER — APPOINTMENT (OUTPATIENT)
Dept: VASCULAR LAB | Facility: MEDICAL CENTER | Age: 63
End: 2018-08-06
Attending: INTERNAL MEDICINE
Payer: COMMERCIAL

## 2018-08-06 DIAGNOSIS — Z79.01 LONG TERM (CURRENT) USE OF ANTICOAGULANTS: ICD-10-CM

## 2018-08-06 LAB — INR PPP: 2.2 (ref 2–3.5)

## 2018-08-07 NOTE — PROGRESS NOTES
Anticoagulation Summary  As of 8/6/2018    INR goal:   2.0-3.0   TTR:   79.1 % (3 y)   Today's INR:   2.2   Warfarin maintenance plan:   4.5 mg (3 mg x 1.5) on Mon, Fri; 3 mg (3 mg x 1) all other days   Weekly warfarin total:   24 mg   Plan last modified:   Penelope Rojo PharmD (12/18/2017)   Next INR check:   10/15/2018   Target end date:   Indefinite    Indications    Pulmonary embolism (HCC) [I26.99]  Long term (current) use of anticoagulants [Z79.01] [Z79.01]             Anticoagulation Episode Summary     [facility]    Preferred lab:       Send INR reminders to:       Comments:   see referral under media from ellen lucero DO       Anticoagulation Care Providers     Provider Role Specialty Phone number    Ellen Lucero D.O. Referring Family Medicine [phone]    [facility] Responsible  [phone]        Anticoagulation Patient Findings  Patient Findings     Negatives:   Signs/symptoms of thrombosis, Signs/symptoms of bleeding, Laboratory test error suspected, Change in health, Change in alcohol use, Change in activity, Upcoming invasive procedure, Emergency department visit, Upcoming dental procedure, Missed doses, Extra doses, Change in medications, Change in diet/appetite, Hospital admission, Bruising, Other complaints        Spoke with the patient on the phone today, reporting a therapeutic INR of 2.2.  Confirmed the current warfarin dosing regimen and patient compliance. Patient denies any interval changes to diet and/or medications. Patient denies any signs/symptoms of bleeding or clotting. Patient instructed to continue with the current warfarin dosing regimen, and asked to follow up again in ~ 10 weeks.  Patient will be coming in to clinic next visit.    Martha Elliott PharmD

## 2018-09-26 DIAGNOSIS — I10 ESSENTIAL HYPERTENSION: ICD-10-CM

## 2018-09-27 DIAGNOSIS — F41.9 ANXIETY: ICD-10-CM

## 2018-09-27 RX ORDER — LISINOPRIL 10 MG/1
TABLET ORAL
Refills: 1 | OUTPATIENT
Start: 2018-09-27

## 2018-09-28 DIAGNOSIS — E78.5 HYPERLIPIDEMIA, UNSPECIFIED HYPERLIPIDEMIA TYPE: ICD-10-CM

## 2018-09-28 DIAGNOSIS — Z79.01 CHRONIC ANTICOAGULATION: ICD-10-CM

## 2018-09-28 DIAGNOSIS — Z86.711 HISTORY OF PULMONARY EMBOLISM: ICD-10-CM

## 2018-09-28 RX ORDER — FENOFIBRATE 160 MG/1
160 TABLET ORAL
Qty: 90 TAB | Refills: 0 | Status: SHIPPED | OUTPATIENT
Start: 2018-09-28 | End: 2018-12-31 | Stop reason: SDUPTHER

## 2018-09-28 RX ORDER — WARFARIN SODIUM 3 MG/1
TABLET ORAL
Qty: 135 TAB | Refills: 0 | Status: SHIPPED | OUTPATIENT
Start: 2018-09-28

## 2018-09-28 NOTE — TELEPHONE ENCOUNTER
Was the patient seen in the last year in this department? Yes    Does patient have an active prescription for medications requested? No     Received Request Via: Patient     Patient requests change in pharmacy. Chart updated and prescription called into   [facility] [place] - [address]  [address] [place] [address]  Phone: [phone] Fax: [phone]

## 2018-10-13 ENCOUNTER — HOSPITAL ENCOUNTER (OUTPATIENT)
Dept: LAB | Facility: MEDICAL CENTER | Age: 63
End: 2018-10-13
Attending: FAMILY MEDICINE
Payer: COMMERCIAL

## 2018-10-13 LAB
ALBUMIN SERPL BCP-MCNC: 4 G/DL (ref 3.2–4.9)
ALBUMIN/GLOB SERPL: 1.4 G/DL
ALP SERPL-CCNC: 26 U/L (ref 30–99)
ALT SERPL-CCNC: 13 U/L (ref 2–50)
ANION GAP SERPL CALC-SCNC: 6 MMOL/L (ref 0–11.9)
AST SERPL-CCNC: 17 U/L (ref 12–45)
BILIRUB SERPL-MCNC: 0.6 MG/DL (ref 0.1–1.5)
BUN SERPL-MCNC: 18 MG/DL (ref 8–22)
CALCIUM SERPL-MCNC: 9.7 MG/DL (ref 8.5–10.5)
CHLORIDE SERPL-SCNC: 106 MMOL/L (ref 96–112)
CHOLEST SERPL-MCNC: 125 MG/DL (ref 100–199)
CO2 SERPL-SCNC: 26 MMOL/L (ref 20–33)
CREAT SERPL-MCNC: 1.15 MG/DL (ref 0.5–1.4)
EST. AVERAGE GLUCOSE BLD GHB EST-MCNC: 120 MG/DL
GLOBULIN SER CALC-MCNC: 2.8 G/DL (ref 1.9–3.5)
GLUCOSE SERPL-MCNC: 101 MG/DL (ref 65–99)
HBA1C MFR BLD: 5.8 % (ref 0–5.6)
HDLC SERPL-MCNC: 41 MG/DL
LDLC SERPL CALC-MCNC: 74 MG/DL
POTASSIUM SERPL-SCNC: 4.8 MMOL/L (ref 3.6–5.5)
PROT SERPL-MCNC: 6.8 G/DL (ref 6–8.2)
SODIUM SERPL-SCNC: 138 MMOL/L (ref 135–145)
TRIGL SERPL-MCNC: 51 MG/DL (ref 0–149)

## 2018-10-13 PROCEDURE — 36415 COLL VENOUS BLD VENIPUNCTURE: CPT

## 2018-10-13 PROCEDURE — 80061 LIPID PANEL: CPT

## 2018-10-13 PROCEDURE — 83036 HEMOGLOBIN GLYCOSYLATED A1C: CPT

## 2018-10-13 PROCEDURE — 80053 COMPREHEN METABOLIC PANEL: CPT

## 2018-10-15 ENCOUNTER — ANTICOAGULATION VISIT (OUTPATIENT)
Dept: VASCULAR LAB | Facility: MEDICAL CENTER | Age: 63
End: 2018-10-15
Attending: INTERNAL MEDICINE
Payer: COMMERCIAL

## 2018-10-15 ENCOUNTER — OFFICE VISIT (OUTPATIENT)
Dept: MEDICAL GROUP | Facility: PHYSICIAN GROUP | Age: 63
End: 2018-10-15
Payer: COMMERCIAL

## 2018-10-15 VITALS
SYSTOLIC BLOOD PRESSURE: 126 MMHG | HEART RATE: 51 BPM | HEIGHT: 69 IN | OXYGEN SATURATION: 98 % | DIASTOLIC BLOOD PRESSURE: 84 MMHG | TEMPERATURE: 97.3 F | RESPIRATION RATE: 16 BRPM | BODY MASS INDEX: 44.58 KG/M2 | WEIGHT: 301 LBS

## 2018-10-15 VITALS — DIASTOLIC BLOOD PRESSURE: 52 MMHG | HEART RATE: 57 BPM | SYSTOLIC BLOOD PRESSURE: 107 MMHG

## 2018-10-15 DIAGNOSIS — E66.9 DIABETES MELLITUS TYPE 2 IN OBESE (HCC): ICD-10-CM

## 2018-10-15 DIAGNOSIS — I26.99 OTHER PULMONARY EMBOLISM WITHOUT ACUTE COR PULMONALE, UNSPECIFIED CHRONICITY (HCC): ICD-10-CM

## 2018-10-15 DIAGNOSIS — E66.01 MORBID OBESITY WITH BMI OF 40.0-44.9, ADULT (HCC): ICD-10-CM

## 2018-10-15 DIAGNOSIS — Z79.01 LONG TERM (CURRENT) USE OF ANTICOAGULANTS: ICD-10-CM

## 2018-10-15 DIAGNOSIS — E11.69 DIABETES MELLITUS TYPE 2 IN OBESE (HCC): ICD-10-CM

## 2018-10-15 DIAGNOSIS — Z23 NEED FOR VACCINATION: ICD-10-CM

## 2018-10-15 DIAGNOSIS — F41.9 ANXIETY: ICD-10-CM

## 2018-10-15 PROBLEM — I10 HYPERTENSION: Status: ACTIVE | Noted: 2018-10-15

## 2018-10-15 LAB — INR PPP: 1.4 (ref 2–3.5)

## 2018-10-15 PROCEDURE — 99213 OFFICE O/P EST LOW 20 MIN: CPT | Mod: 25 | Performed by: INTERNAL MEDICINE

## 2018-10-15 PROCEDURE — 90471 IMMUNIZATION ADMIN: CPT | Performed by: INTERNAL MEDICINE

## 2018-10-15 PROCEDURE — 85610 PROTHROMBIN TIME: CPT

## 2018-10-15 PROCEDURE — 90686 IIV4 VACC NO PRSV 0.5 ML IM: CPT | Performed by: INTERNAL MEDICINE

## 2018-10-15 PROCEDURE — 99212 OFFICE O/P EST SF 10 MIN: CPT | Performed by: NURSE PRACTITIONER

## 2018-10-15 RX ORDER — NITROFURANTOIN 25; 75 MG/1; MG/1
CAPSULE ORAL
COMMUNITY
Start: 2018-08-10 | End: 2018-10-01

## 2018-10-15 NOTE — ASSESSMENT & PLAN NOTE
Taking 1/2 tablet of lisinopril 10 mg daily around Aug/Sept and metoprolol 25 mg daily, home blood pressures are ranging 100/50s. Still has dizziness at times (with bending and getting up) on the 1/2 tablet of lisinopril 10 mg daily.

## 2018-10-15 NOTE — PROGRESS NOTES
PRIMARY CARE CLINIC FOLLOW UP VISIT  Chief Complaint   Patient presents with   • Diabetes Mellitus   • Dizziness   • Hyperlipidemia   • Hypothyroidism     History of Present Illness     Hypertension  Taking 1/2 tablet of lisinopril 10 mg daily around Aug/Sept and metoprolol 25 mg daily, home blood pressures are ranging 100/50s. Still has dizziness at times (with bending and getting up) on the 1/2 tablet of lisinopril 10 mg daily.     Diabetes mellitus type 2 in obese  Diet controlled, most recent A1c from a few days ago was 5.8%.     Pulmonary embolism (HCC)  Last CT was several years ago for extensive PE. Didn't qualify for repeat CT PE given concern for contrast with known CKD.     Anxiety  Continues to be well controlled on zoloft 50 mg daily. Wants 100 mg tablets that he will split in half.     Current Outpatient Prescriptions   Medication Sig Dispense Refill   • sertraline (ZOLOFT) 50 MG Tab Take 1 Tab by mouth every bedtime. 90 Tab 0   • warfarin (COUMADIN) 3 MG Tab TAKE 1 TO 1 & 1/2 TABLETS BY MOUTH DAILY AS DIRECTED BY  [facility] 135 Tab 0   • fenofibrate (TRIGLIDE) 160 MG tablet Take 1 Tab by mouth every bedtime. 90 Tab 0   • lisinopril (PRINIVIL) 10 MG Tab Take 1 Tab by mouth every bedtime. 90 Tab 1   • atorvastatin (LIPITOR) 10 MG Tab Take 1 Tab by mouth every day. 90 Tab 1   • tamsulosin (FLOMAX) 0.4 MG capsule Take 1 Cap by mouth every bedtime. 90 Cap 1   • pantoprazole (PROTONIX) 40 MG Tablet Delayed Response Take 1 Tab by mouth 2 times a day. 180 Tab 1     No current facility-administered medications for this visit.      Past Medical History:   Diagnosis Date   • Arthritis 3/28/2018   • Bleeding ulcer 9/15/2012   • BPH (benign prostatic hyperplasia)    • Dental disorder     partial upper and lower   • Diabetes     oral agents   • Diabetes mellitus type 2 in obese (HCC) 12/24/2011   • Heart burn    • Hiatus hernia syndrome    • Hyperlipidemia 3/28/2018   • Hypertension    • Morbid  "obesity with BMI of 45.0-49.9, adult ([facility]) 3/28/2018   • Obesity    • COREY on CPAP 2011   • Personal history of venous thrombosis and embolism    • Pulmonary embolus ([facility]) 2011, 2012    bilateral   • Sleep apnea     Uses CPAP     Past Surgical History:   Procedure Laterality Date   • SHOULDER ARTHROSCOPY W/ ROTATOR CUFF REPAIR  2013    Performed by Tae Sommers M.D. at SURGERY [facility]   • SHOULDER DECOMPRESSION ARTHROSCOPIC  2013    Performed by Tae Sommers M.D. at SURGERY [facility]   • GASTROSCOPY WITH BIOPSY  3/16/2012    Performed by KEVIN QUINTANILLA at SURGERY [facility]   • COLONOSCOPY WITH BIOPSY  3/16/2012    Performed by KEVIN QUINTANILLA at SURGERY [facility]   • GASTROSCOPY-ENDO  2012    Performed by KEVIN QUINTANILLA at ENDOSCOPY [facility]   • GASTROSCOPY-ENDO  1/3/2012    Performed by KEVIN QUINTANILLA at ENDOSCOPY [facility]   • PB APPENDECTOMY  as child     Social History   Substance Use Topics   • Smoking status: Former Smoker     Packs/day: 1.50     Years: 30.00     Quit date: 2001   • Smokeless tobacco: Never Used   • Alcohol use Yes      Comment: occassional     Social History     Social History Narrative    Determined eligibility for state programs for [facility]     Family History   Problem Relation Age of Onset   • Diabetes Mother    • Dementia Father      Family Status   Relation Status   • Mo Alive, age 93y   • Fa    • Sis Alive   • Sis Alive   • Sis Alive     Allergies: Levofloxacin    ROS  As per HPI above. All other systems reviewed and negative.        Objective   Blood pressure 126/84, pulse (!) 51, temperature 36.3 °C (97.3 °F), temperature source Temporal, resp. rate 16, height 1.753 m (5' 9\"), weight (!) 136.5 kg (301 lb), SpO2 98 %. Body mass index is 44.45 kg/m².    General: alert and oriented, pleasant, cooperative  HEENT: Normocephalic, atraumatic.   Cardiovascular: " regular rate and rhythm, normal S1/S2  Pulmonary: lungs clear to auscultation bilaterally  Skin: warm and dry, no lesions or rashes  Psychiatric: appropriate mood and affect. Good insight and appropriate judgment     Assessment and Plan   The following treatment plan was discussed     1. Need for vaccination  - Influenza Vaccine Quad Injection >3Y (PF)    2. Diabetes mellitus type 2 in obese (HCC)  Most recent A1c was 5.8%, diet controlled. On statin, ACEi. Due for hepatitis B vaccines but leaving for several months to spend winter in [place], will start series when he returns in the spring in 6 months.   - COMP METABOLIC PANEL; Future  - CBC WITH DIFFERENTIAL; Future  - LIPID PROFILE; Future  - HEMOGLOBIN A1C; Future  - MICROALBUMIN CREAT RATIO URINE; Future    3. Other pulmonary embolism without acute cor pulmonale, unspecified chronicity (HCC)  Advised he continue the warfarin, last CT PE from 2011 noting extensive PE.     4. Anxiety  Well controlled on sertraline 50 mg daily, would like the 100 mg tablet next time so he can split it in half.     5. Morbid obesity with BMI of 40.0-44.9, adult (HCC)  - Patient identified as having weight management issue.  Appropriate orders and counseling given.    6. Dizziness  Advised he continue the 1/2 tab of lisinopril 10 mg daily for now but may stop metoprolol 25 mg daily. Advised to inform me if blood pressures rise to consistently 130/80 or above on this regimen in which case would consider the full tablet of lisinopril 10 mg.     Healthcare maintenance     Health Maintenance Due   Topic Date Due   • IMM HEP B VACCINE (1 of 3 - Risk 3-dose series) 10/16/1974       Return in about 6 months (around 4/15/2019) for after labs .    Oibnna Stroud MD  Internal Medicine  [facility]

## 2018-10-15 NOTE — ASSESSMENT & PLAN NOTE
Last CT was several years ago for extensive PE. Didn't qualify for repeat CT PE given concern for contrast with known CKD.

## 2018-10-15 NOTE — PROGRESS NOTES
Anticoagulation Summary  As of 10/15/2018    INR goal:   2.0-3.0   TTR:   75.8 % (3.2 y)   Today's INR:   1.4!   Warfarin maintenance plan:   4.5 mg (3 mg x 1.5) on Mon, Fri; 3 mg (3 mg x 1) all other days   Weekly warfarin total:   24 mg   Plan last modified:   Penelope Rojo, PharmD (12/18/2017)   Next INR check:      Target end date:   Indefinite    Indications    Pulmonary embolism (HCC) [I26.99]  Long term (current) use of anticoagulants [Z79.01] [Z79.01]             Anticoagulation Episode Summary     IN[facility] check location:   [facility]    Preferred lab:       Send INR reminders to:       Comments:   see referral under media from ellen lucero DO       Anticoagulation Care Providers     Provider Role Specialty Phone number    Ellen Lucero D.O. Referring Family Medicine [phone]    [facility] Responsible  [phone]        Anticoagulation Patient Findings      HPI:  Bebo Hayesona seen in clinic today for follow up on anticoagulation therapy in the presence of PE hx. Denies any changes to current medical/health status since last appointment. Denies any medication or diet changes. No current symptoms of bleeding or thrombosis reported.    A/P:   INR subtherapeutic. Will increase two doses then continue current regimen. BP recorded in vitals.    Follow up appointment in 2 week(s).    Next Appointment: He will use home monitor.    Kylee OSHEA

## 2018-10-15 NOTE — ASSESSMENT & PLAN NOTE
Continues to be well controlled on zoloft 50 mg daily. Wants 100 mg tablets that he will split in half.

## 2018-10-17 LAB — INR BLD: 1.4 (ref 0.9–1.2)

## 2018-11-05 DIAGNOSIS — K28.4 BLEEDING ULCER: ICD-10-CM

## 2018-11-05 RX ORDER — PANTOPRAZOLE SODIUM 40 MG/1
40 TABLET, DELAYED RELEASE ORAL 2 TIMES DAILY
Qty: 180 TAB | Refills: 1 | Status: SHIPPED | OUTPATIENT
Start: 2018-11-05 | End: 2019-06-30 | Stop reason: SDUPTHER

## 2018-11-05 NOTE — TELEPHONE ENCOUNTER
Was the patient seen in the last year in this department? Yes    Does patient have an active prescription for medications requested? No     Received Request Via: Pharmacy      Pt met protocol?: Yes  pt last ov 10/2018 wants sent to [place] [facility]

## 2018-11-15 ENCOUNTER — TELEPHONE (OUTPATIENT)
Dept: VASCULAR LAB | Facility: MEDICAL CENTER | Age: 63
End: 2018-11-15

## 2018-11-15 NOTE — TELEPHONE ENCOUNTER
[facility]    Left a voicemail to remind pt to obtain next INR.    El Ellis, PharmD

## 2018-12-06 ENCOUNTER — TELEPHONE (OUTPATIENT)
Dept: VASCULAR LAB | Facility: MEDICAL CENTER | Age: 63
End: 2018-12-06

## 2018-12-31 DIAGNOSIS — E78.5 HYPERLIPIDEMIA, UNSPECIFIED HYPERLIPIDEMIA TYPE: ICD-10-CM

## 2018-12-31 NOTE — TELEPHONE ENCOUNTER
Was the patient seen in the last year in this department? Yes    Does patient have an active prescription for medications requested? No     Received Request Via: Pharmacy      Pt met protocol?: Yes    Pt last ov 10/2018   Lab Results  Component Value Date/Time   CHOLSTRLTOT 125 10/13/2018 0851   TRIGLYCERIDE 51 10/13/2018 0851   HDL 41 10/13/2018 0851   LDL 74 10/13/2018 0851       Lab Results   Component Value Date/Time    HBA1C 5.8 (H) 10/13/2018 08:51 AM

## 2019-01-03 ENCOUNTER — TELEPHONE (OUTPATIENT)
Dept: VASCULAR LAB | Facility: MEDICAL CENTER | Age: 64
End: 2019-01-03

## 2019-01-03 RX ORDER — FENOFIBRATE 160 MG/1
TABLET ORAL
Qty: 90 TAB | Refills: 2 | Status: SHIPPED | OUTPATIENT
Start: 2019-01-03 | End: 2019-09-23 | Stop reason: SDUPTHER

## 2019-01-03 NOTE — TELEPHONE ENCOUNTER
[facility]    Left a voicemail to remind pt to obtain next INR. Sent letter of compliance.    El Ellis, BrooksD

## 2019-01-03 NOTE — TELEPHONE ENCOUNTER
Pt has had OV within the 12 month protocol and lipid panel is current. 9 month supply sent to pharmacy.   Lab Results   Component Value Date/Time    CHOLSTRLTOT 125 10/13/2018 08:51 AM    LDL 74 10/13/2018 08:51 AM    HDL 41 10/13/2018 08:51 AM    TRIGLYCERIDE 51 10/13/2018 08:51 AM       Lab Results   Component Value Date/Time    SODIUM 138 10/13/2018 08:51 AM    POTASSIUM 4.8 10/13/2018 08:51 AM    CHLORIDE 106 10/13/2018 08:51 AM    CO2 26 10/13/2018 08:51 AM    GLUCOSE 101 (H) 10/13/2018 08:51 AM    BUN 18 10/13/2018 08:51 AM    CREATININE 1.15 10/13/2018 08:51 AM     Lab Results   Component Value Date/Time    ALKPHOSPHAT 26 (L) 10/13/2018 08:51 AM    ASTSGOT 17 10/13/2018 08:51 AM    ALTSGPT 13 10/13/2018 08:51 AM    TBILIRUBIN 0.6 10/13/2018 08:51 AM

## 2019-01-10 ENCOUNTER — TELEPHONE (OUTPATIENT)
Dept: VASCULAR LAB | Facility: MEDICAL CENTER | Age: 64
End: 2019-01-10

## 2019-01-10 NOTE — PROGRESS NOTES
Attempted calling patient @ [phone]. Went straight to voicemail, but left message stating that we would like to talk to the patient about why they have discontinued Coumadin before services are discontinued and encouraged patient to call us back.

## 2019-01-22 RX ORDER — TAMSULOSIN HYDROCHLORIDE 0.4 MG/1
CAPSULE ORAL
Qty: 90 CAP | Refills: 1 | Status: SHIPPED | OUTPATIENT
Start: 2019-01-22

## 2019-02-22 ENCOUNTER — TELEPHONE (OUTPATIENT)
Dept: VASCULAR LAB | Facility: MEDICAL CENTER | Age: 64
End: 2019-02-22

## 2019-02-22 NOTE — TELEPHONE ENCOUNTER
[facility]      Unable to reach pt for over 3 months, see below for dates:    2/22 - 3rd reminder, instructed pt this may be the last reminder he receives.  1/3/19 - sent letter  12/6/18 - 2nd reminder  11/15/18 - 1st reminder    Suggest discharge from [facility]  El Ellis, PharmD    CC Dr Michael Bloch

## 2019-02-28 ENCOUNTER — ANTICOAGULATION MONITORING (OUTPATIENT)
Dept: VASCULAR LAB | Facility: MEDICAL CENTER | Age: 64
End: 2019-02-28

## 2019-02-28 DIAGNOSIS — Z79.01 LONG TERM (CURRENT) USE OF ANTICOAGULANTS: ICD-10-CM

## 2019-02-28 NOTE — PROGRESS NOTES
[facility]    Pt discharged from [facility].    El Ellis, PharmD  CC Dr Obinna Stroud

## 2019-04-22 DIAGNOSIS — I10 ESSENTIAL HYPERTENSION: ICD-10-CM

## 2019-04-22 DIAGNOSIS — E78.5 HYPERLIPIDEMIA, UNSPECIFIED HYPERLIPIDEMIA TYPE: ICD-10-CM

## 2019-04-24 RX ORDER — ATORVASTATIN CALCIUM 10 MG/1
TABLET, FILM COATED ORAL
Qty: 90 TAB | Refills: 1 | Status: SHIPPED | OUTPATIENT
Start: 2019-04-24

## 2019-04-24 RX ORDER — LISINOPRIL 10 MG/1
TABLET ORAL
Qty: 90 TAB | Refills: 1 | Status: SHIPPED | OUTPATIENT
Start: 2019-04-24 | End: 2019-11-01 | Stop reason: SDUPTHER

## 2019-04-24 NOTE — TELEPHONE ENCOUNTER
Pt has had OV within the 12 month protocol and lipid panel is current. 6 month supply sent to pharmacy.   Lab Results   Component Value Date/Time    CHOLSTRLTOT 125 10/13/2018 08:51 AM    LDL 74 10/13/2018 08:51 AM    HDL 41 10/13/2018 08:51 AM    TRIGLYCERIDE 51 10/13/2018 08:51 AM       Lab Results   Component Value Date/Time    SODIUM 138 10/13/2018 08:51 AM    POTASSIUM 4.8 10/13/2018 08:51 AM    CHLORIDE 106 10/13/2018 08:51 AM    CO2 26 10/13/2018 08:51 AM    GLUCOSE 101 (H) 10/13/2018 08:51 AM    BUN 18 10/13/2018 08:51 AM    CREATININE 1.15 10/13/2018 08:51 AM     Lab Results   Component Value Date/Time    ALKPHOSPHAT 26 (L) 10/13/2018 08:51 AM    ASTSGOT 17 10/13/2018 08:51 AM    ALTSGPT 13 10/13/2018 08:51 AM    TBILIRUBIN 0.6 10/13/2018 08:51 AM

## 2019-06-12 ENCOUNTER — TELEPHONE (OUTPATIENT)
Dept: MEDICAL GROUP | Facility: PHYSICIAN GROUP | Age: 64
End: 2019-06-12

## 2019-06-12 NOTE — TELEPHONE ENCOUNTER
Pt is trying to get new CPAP. Pt needs to contact preferred home [facility] to get records so he can get new machine. [facility] referred pt to [facility] in [place] to deal with CPAP machine.

## 2019-06-12 NOTE — TELEPHONE ENCOUNTER
VOICEMAIL  1. Caller Name: Tobias                      Call Back Number: [phone] (home)      2. Message: I was a pt of  but I have moved to [place] and I need some information from my records.     3. Patient approves office to leave a detailed voicemail/MyChart message: N\A

## 2019-06-30 DIAGNOSIS — K28.4 BLEEDING ULCER: ICD-10-CM

## 2019-07-01 RX ORDER — PANTOPRAZOLE SODIUM 40 MG/1
TABLET, DELAYED RELEASE ORAL
Qty: 180 TAB | Refills: 1 | Status: SHIPPED | OUTPATIENT
Start: 2019-07-01

## 2019-07-01 NOTE — TELEPHONE ENCOUNTER
Was the patient seen in the last year in this department? Yes    Does patient have an active prescription for medications requested? No     Received Request Via: Pharmacy      Pt met protocol?: Yes, ov 10/18

## 2019-09-18 ENCOUNTER — OFFICE VISIT (OUTPATIENT)
Dept: URBAN - METROPOLITAN AREA CLINIC 18 | Facility: CLINIC | Age: 64
End: 2019-09-18
Payer: COMMERCIAL

## 2019-09-18 DIAGNOSIS — H43.812 VITREOUS DEGENERATION, LEFT EYE: ICD-10-CM

## 2019-09-18 PROCEDURE — 99203 OFFICE O/P NEW LOW 30 MIN: CPT | Performed by: OPTOMETRIST

## 2019-09-18 ASSESSMENT — KERATOMETRY
OD: 42.13
OS: 42.13

## 2019-09-18 ASSESSMENT — INTRAOCULAR PRESSURE
OD: 14
OS: 15

## 2019-09-18 NOTE — IMPRESSION/PLAN
Impression: Type 2 diabetes mellitus w/o complication: Q21.6. Plan: Diabetes type II: no background retinopathy, no signs of neovascularization noted. Discussed ocular and systemic benefits of blood sugar control.

## 2019-09-18 NOTE — IMPRESSION/PLAN
Impression: Age-related nuclear cataract, bilateral: H25.13. Plan: Cataracts account for the patient's complaints. No treatment currently recommended. The patient will monitor vision changes and contact us with any decrease in vision. Recommended OTC gls.

## 2019-09-23 DIAGNOSIS — E78.5 HYPERLIPIDEMIA, UNSPECIFIED HYPERLIPIDEMIA TYPE: ICD-10-CM

## 2019-09-24 RX ORDER — FENOFIBRATE 160 MG/1
TABLET ORAL
Qty: 90 TAB | Refills: 0 | Status: SHIPPED | OUTPATIENT
Start: 2019-09-24

## 2019-09-24 NOTE — TELEPHONE ENCOUNTER
Spoke to Bebo, He now lives in [place]. For whatever reason, the pharmacy keeps sending medication refill request to Dr. Stroud. He has told his pharmacy that he has a new Dr. He will go down and tell them again.

## 2019-09-24 NOTE — TELEPHONE ENCOUNTER
Was the patient seen in the last year in this department? Yes    Does patient have an active prescription for medications requested? No     Received Request Via: Pharmacy    Pt met protocol?: Yes     Last OV 10/15/18    Lab Results   Component Value Date/Time    CHOLSTRLTOT 125 10/13/2018 0851    TRIGLYCERIDE 51 10/13/2018 0851    HDL 41 10/13/2018 0851    LDL 74 10/13/2018 0851

## 2019-11-01 DIAGNOSIS — I10 ESSENTIAL HYPERTENSION: ICD-10-CM

## 2019-11-04 RX ORDER — LISINOPRIL 10 MG/1
TABLET ORAL
Qty: 30 TAB | Refills: 0 | Status: SHIPPED | OUTPATIENT
Start: 2019-11-04

## 2019-11-04 NOTE — TELEPHONE ENCOUNTER
Pt is now a resident of [place]. Pt will call new PCP in [place] and take Dr. Stroud off as PCP at pharmacy.

## 2020-01-12 DIAGNOSIS — E78.5 HYPERLIPIDEMIA, UNSPECIFIED HYPERLIPIDEMIA TYPE: ICD-10-CM

## 2020-01-14 RX ORDER — FENOFIBRATE 160 MG/1
TABLET ORAL
Qty: 90 TAB | Refills: 0 | OUTPATIENT
Start: 2020-01-14

## 2020-01-14 NOTE — TELEPHONE ENCOUNTER
Was the patient seen in the last year in this department? No     Does patient have an active prescription for medications requested? No     Received Request Via: Pharmacy      Pt met protocol?: no   Last ov 10/2018 has no upcoming appt      Lab Results   Component Value Date/Time    CHOLSTRLTOT 125 10/13/2018 0851    TRIGLYCERIDE 51 10/13/2018 0851    HDL 41 10/13/2018 0851    LDL 74 10/13/2018 0851     Lab Results   Component Value Date/Time    HBA1C 5.8 (H) 10/13/2018 08:51 AM

## 2020-09-03 NOTE — ASSESSMENT & PLAN NOTE
He thinks he has lost 90 lbs since October 2017 when his wife passed.    PT CALLED TO CANCEL APPT FOR TODAY 9/3/20 @ 4782, DUE TO NOT FEELING WELL  PT STATED THEY WILL CALL BACK TO RESCHEDULE

## 2020-11-18 ENCOUNTER — OFFICE VISIT (OUTPATIENT)
Dept: URBAN - METROPOLITAN AREA CLINIC 18 | Facility: CLINIC | Age: 65
End: 2020-11-18
Payer: MEDICARE

## 2020-11-18 DIAGNOSIS — H43.813 VITREOUS DEGENERATION, BILATERAL: Chronic | ICD-10-CM

## 2020-11-18 PROCEDURE — 92014 COMPRE OPH EXAM EST PT 1/>: CPT | Performed by: OPTOMETRIST

## 2020-11-18 ASSESSMENT — INTRAOCULAR PRESSURE
OS: 9
OD: 10

## 2020-11-18 NOTE — IMPRESSION/PLAN
Impression: Type 2 diabetes mellitus w/o complication: Y62.0. Plan: Diabetes type II: no background retinopathy, no signs of neovascularization noted. Discussed ocular and systemic benefits of blood sugar control.

## 2021-11-17 ENCOUNTER — OFFICE VISIT (OUTPATIENT)
Dept: URBAN - METROPOLITAN AREA CLINIC 18 | Facility: CLINIC | Age: 66
End: 2021-11-17
Payer: MEDICARE

## 2021-11-17 DIAGNOSIS — H25.13 AGE-RELATED NUCLEAR CATARACT, BILATERAL: ICD-10-CM

## 2021-11-17 DIAGNOSIS — E11.9 TYPE 2 DIABETES MELLITUS W/O COMPLICATION: Primary | ICD-10-CM

## 2021-11-17 PROCEDURE — 99213 OFFICE O/P EST LOW 20 MIN: CPT | Performed by: OPTOMETRIST

## 2021-11-17 ASSESSMENT — INTRAOCULAR PRESSURE
OD: 16
OS: 14

## 2021-11-17 NOTE — IMPRESSION/PLAN
Impression: Age-related nuclear cataract, bilateral: H25.13. Plan: Cataracts account for the patient's complaints. Patient education was discussed regarding cataracts, lens options and surgery. Recommend cataract consult with surgeon. Order A-Scan. Hold off on filling any new glasses prescription until after the consultation.

## 2021-11-17 NOTE — IMPRESSION/PLAN
Impression: Type 2 diabetes mellitus w/o complication: U56.1. Plan: Diabetes type II: no background retinopathy, no signs of neovascularization noted. Discussed ocular and systemic benefits of blood sugar control. Return to clinic with Dr. Gildardo Hodgkin in one year for a diabetic eye exam and MAC OCT.

## 2021-12-14 NOTE — TELEPHONE ENCOUNTER
Patient left a message regarding his INR. Attempted to call patient back but phone went straight to voicemail. Left a message for patient to call back.   
Statement Selected

## 2022-01-17 ENCOUNTER — OFFICE VISIT (OUTPATIENT)
Dept: URBAN - METROPOLITAN AREA CLINIC 17 | Facility: CLINIC | Age: 67
End: 2022-01-17
Payer: MEDICARE

## 2022-01-17 PROCEDURE — 99204 OFFICE O/P NEW MOD 45 MIN: CPT | Performed by: OPHTHALMOLOGY

## 2022-01-17 ASSESSMENT — VISUAL ACUITY
OS: 20/30
OD: 20/30

## 2022-01-17 ASSESSMENT — KERATOMETRY
OD: 256.88
OS: 42.38

## 2022-01-17 ASSESSMENT — INTRAOCULAR PRESSURE
OD: 15
OS: 15

## 2022-01-17 NOTE — IMPRESSION/PLAN
Impression: Age-related nuclear cataract, bilateral: H25.13. Condition: established, worsening. Symptoms: may improve with surgery. Plan: Cataract accounts for patient's complaints. Reviewed risks, benefits, and procedure. Patient desires surgery, schedule ce/iol OD then OS, RL2, discussed lens options, distance refractive target, patient is clear for surgery in [facility] 27. Recommend Dexycu to avoid noncompliance with drops and to help maintain infection/inflammation.

## 2022-01-17 NOTE — IMPRESSION/PLAN
Impression: Age-related nuclear cataract, left eye: H25.12. Plan: Plan for CEIOL OD then OS as stated above.

## 2022-02-08 ENCOUNTER — PRE-OPERATIVE VISIT (OUTPATIENT)
Dept: URBAN - METROPOLITAN AREA CLINIC 17 | Facility: CLINIC | Age: 67
End: 2022-02-08
Payer: MEDICARE

## 2022-02-08 ASSESSMENT — PACHYMETRY
OS: 24.02
OS: 2.53
OD: 24.38
OD: 2.51

## 2022-02-17 ENCOUNTER — SURGERY (OUTPATIENT)
Dept: URBAN - METROPOLITAN AREA SURGERY 7 | Facility: SURGERY | Age: 67
End: 2022-02-17
Payer: MEDICARE

## 2022-02-17 PROCEDURE — 66984 XCAPSL CTRC RMVL W/O ECP: CPT | Performed by: OPHTHALMOLOGY

## 2022-02-18 ENCOUNTER — POST-OPERATIVE VISIT (OUTPATIENT)
Dept: URBAN - METROPOLITAN AREA CLINIC 18 | Facility: CLINIC | Age: 67
End: 2022-02-18
Payer: MEDICARE

## 2022-02-18 PROCEDURE — 99024 POSTOP FOLLOW-UP VISIT: CPT | Performed by: OPTOMETRIST

## 2022-02-18 ASSESSMENT — INTRAOCULAR PRESSURE
OD: 14
OS: 12

## 2022-02-18 NOTE — IMPRESSION/PLAN
Impression: S/P Cataract Extraction by phacoemulsification with IOL placement [id number] OD - 1 Day. Encounter for surgical aftercare following surgery on a sense organ  Z48.810. Plan: Trace edema and PC haze. Recovering well. Review post op care, keep PO2 appt as scheduled. --Advised patient to use artificial tears for comfort.

## 2022-02-24 ENCOUNTER — POST-OPERATIVE VISIT (OUTPATIENT)
Dept: URBAN - METROPOLITAN AREA CLINIC 18 | Facility: CLINIC | Age: 67
End: 2022-02-24
Payer: MEDICARE

## 2022-02-24 PROCEDURE — 99024 POSTOP FOLLOW-UP VISIT: CPT | Performed by: OPTOMETRIST

## 2022-02-24 ASSESSMENT — INTRAOCULAR PRESSURE
OS: 15
OD: 17

## 2022-02-24 NOTE — IMPRESSION/PLAN
Impression: S/P Cataract Extraction by phacoemulsification with IOL placement [id number] OD - 7 Days. Encounter for surgical aftercare following surgery on a sense organ  Z48.810. Excellent post op course   Post operative instructions reviewed - Plan: PO1 next week for 2nd eye. Continue ATs BID. --Advised patient to use artificial tears for comfort.

## 2022-02-28 ENCOUNTER — POST-OPERATIVE VISIT (OUTPATIENT)
Dept: URBAN - METROPOLITAN AREA CLINIC 18 | Facility: CLINIC | Age: 67
End: 2022-02-28
Payer: MEDICARE

## 2022-02-28 DIAGNOSIS — Z48.810 ENCOUNTER FOR SURGICAL AFTERCARE FOLLOWING SURGERY ON A SENSE ORGAN: Primary | ICD-10-CM

## 2022-02-28 PROCEDURE — 99024 POSTOP FOLLOW-UP VISIT: CPT | Performed by: OPTOMETRIST

## 2022-02-28 ASSESSMENT — INTRAOCULAR PRESSURE
OS: 13
OD: 10

## 2022-02-28 ASSESSMENT — VISUAL ACUITY: OD: 20/20

## 2022-02-28 NOTE — IMPRESSION/PLAN
Impression: S/P Cataract Extraction by phacoemulsification with IOL placement [id number] OD - 11 Days. Encounter for surgical aftercare following surgery on a sense organ  Z48.810. Excellent post op course   Post operative instructions reviewed - Plan: Continue plan for 2nd eye Sx --Advised patient to use artificial tears for comfort.

## 2022-03-03 ENCOUNTER — SURGERY (OUTPATIENT)
Dept: URBAN - METROPOLITAN AREA SURGERY 7 | Facility: SURGERY | Age: 67
End: 2022-03-03
Payer: MEDICARE

## 2022-03-03 DIAGNOSIS — H25.12 AGE-RELATED NUCLEAR CATARACT, LEFT EYE: Primary | ICD-10-CM

## 2022-03-03 PROCEDURE — 66984 XCAPSL CTRC RMVL W/O ECP: CPT | Performed by: OPHTHALMOLOGY

## 2022-03-04 ENCOUNTER — POST-OPERATIVE VISIT (OUTPATIENT)
Dept: URBAN - METROPOLITAN AREA CLINIC 17 | Facility: CLINIC | Age: 67
End: 2022-03-04
Payer: MEDICARE

## 2022-03-04 DIAGNOSIS — Z96.1 PRESENCE OF INTRAOCULAR LENS: Primary | ICD-10-CM

## 2022-03-04 PROCEDURE — 99024 POSTOP FOLLOW-UP VISIT: CPT | Performed by: OPTOMETRIST

## 2022-03-04 ASSESSMENT — INTRAOCULAR PRESSURE
OD: 14
OS: 21

## 2022-03-04 NOTE — IMPRESSION/PLAN
Impression: S/P Cataract Extraction by phacoemulsification with IOL placement [id number] OS - 1 Day. Presence of intraocular lens  Z96.1. Plan: trace edema, trace PC haze. Reviewed post op care. Keep PO2 appt as scheduled. --Advised patient to use artificial tears for comfort.

## 2022-03-10 ENCOUNTER — POST-OPERATIVE VISIT (OUTPATIENT)
Dept: URBAN - METROPOLITAN AREA CLINIC 17 | Facility: CLINIC | Age: 67
End: 2022-03-10
Payer: MEDICARE

## 2022-03-10 PROCEDURE — 99024 POSTOP FOLLOW-UP VISIT: CPT | Performed by: OPTOMETRIST

## 2022-03-10 ASSESSMENT — INTRAOCULAR PRESSURE
OS: 12
OD: 11

## 2022-03-10 ASSESSMENT — VISUAL ACUITY
OS: 20/20
OD: 20/20

## 2022-03-10 NOTE — IMPRESSION/PLAN
Impression: S/P Cataract Extraction by phacoemulsification with IOL placement [id number] OS - 7 Days. Presence of intraocular lens  Z96.1. Plan: PO3 --Advised patient to use artificial tears for comfort.

## 2022-03-31 ENCOUNTER — POST-OPERATIVE VISIT (OUTPATIENT)
Dept: URBAN - METROPOLITAN AREA CLINIC 17 | Facility: CLINIC | Age: 67
End: 2022-03-31
Payer: MEDICARE

## 2022-03-31 PROCEDURE — 99024 POSTOP FOLLOW-UP VISIT: CPT | Performed by: OPTOMETRIST

## 2022-03-31 ASSESSMENT — VISUAL ACUITY
OD: 20/20
OS: 20/20

## 2022-03-31 ASSESSMENT — INTRAOCULAR PRESSURE
OD: 14
OS: 13

## 2022-03-31 NOTE — IMPRESSION/PLAN
Impression: S/P Cataract Extraction by phacoemulsification with IOL placement [id number] OS - 28 Days. Presence of intraocular lens  Z96.1. Plan: Discussed Dexycu floater, will resolve on its own. Recommend OTC readers +2.00 for close work. RTC in 3 months for DE with Dr. Dinah Bojorquez in [place].

## 2022-06-22 NOTE — ED TRIAGE NOTES
"Chief Complaint   Patient presents with   • Rectal Bleeding     takes warfarin for vascular disease, noticed blood soaked underwear when he went to beed.      Pt ambulatory to triage w/ no assistance for above. Pt has extensive vascular hx, has morbid body habitus. States he noticed blood soaked underwear when he went to bed, doesn't know the source. Charge RN notified.    Blood pressure (!) 180/90, pulse 60, temperature 36.3 °C (97.4 °F), resp. rate 18, height 1.753 m (5' 9\"), weight (!) 155.3 kg (342 lb 6 oz), SpO2 96 %.      "
no

## 2022-06-30 ENCOUNTER — OFFICE VISIT (OUTPATIENT)
Dept: URBAN - METROPOLITAN AREA CLINIC 18 | Facility: CLINIC | Age: 67
End: 2022-06-30
Payer: MEDICARE

## 2022-06-30 DIAGNOSIS — H11.152 PINGUECULA, LEFT EYE: ICD-10-CM

## 2022-06-30 DIAGNOSIS — H43.813 VITREOUS DEGENERATION, BILATERAL: Primary | ICD-10-CM

## 2022-06-30 DIAGNOSIS — Z96.1 PRESENCE OF INTRAOCULAR LENS: ICD-10-CM

## 2022-06-30 PROCEDURE — 99213 OFFICE O/P EST LOW 20 MIN: CPT | Performed by: OPTOMETRIST

## 2022-06-30 ASSESSMENT — INTRAOCULAR PRESSURE
OS: 15
OD: 14

## 2022-06-30 NOTE — IMPRESSION/PLAN
Impression: Pinguecula, left eye: H11.152. Plan: Discussed diagnosis in detail with patient. No treatment is required at this time. Will continue to observe condition and or symptoms. Patient instructed to call if condition gets worse.  Order Refraction per patient request.

## 2023-06-30 ENCOUNTER — OFFICE VISIT (OUTPATIENT)
Dept: URBAN - METROPOLITAN AREA CLINIC 17 | Facility: CLINIC | Age: 68
End: 2023-06-30
Payer: MEDICARE

## 2023-06-30 DIAGNOSIS — H43.813 VITREOUS DEGENERATION, BILATERAL: Primary | ICD-10-CM

## 2023-06-30 DIAGNOSIS — Z96.1 PRESENCE OF INTRAOCULAR LENS: ICD-10-CM

## 2023-06-30 PROCEDURE — 99213 OFFICE O/P EST LOW 20 MIN: CPT | Performed by: OPTOMETRIST

## 2023-06-30 ASSESSMENT — INTRAOCULAR PRESSURE
OS: 16
OD: 14

## 2023-06-30 NOTE — IMPRESSION/PLAN
Impression: Vitreous degeneration, bilateral: H43.813. Plan: Discussed diagnosis in detail with patient. Advised patient of condition. No treatment is required at this time. Will continue to observe condition and or symptoms.